# Patient Record
Sex: MALE | Race: WHITE | Employment: OTHER | URBAN - METROPOLITAN AREA
[De-identification: names, ages, dates, MRNs, and addresses within clinical notes are randomized per-mention and may not be internally consistent; named-entity substitution may affect disease eponyms.]

---

## 2017-08-12 ENCOUNTER — HOSPITAL ENCOUNTER (OUTPATIENT)
Dept: NON INVASIVE DIAGNOSTICS | Age: 57
Discharge: HOME | End: 2017-08-12
Attending: FAMILY MEDICINE | Admitting: FAMILY MEDICINE

## 2017-08-12 DIAGNOSIS — M54.50 CHRONIC LOW BACK PAIN WITHOUT SCIATICA, UNSPECIFIED BACK PAIN LATERALITY: ICD-10-CM

## 2017-08-12 DIAGNOSIS — G89.29 CHRONIC LOW BACK PAIN WITHOUT SCIATICA, UNSPECIFIED BACK PAIN LATERALITY: ICD-10-CM

## 2019-02-19 ENCOUNTER — HOSPITAL ENCOUNTER (OUTPATIENT)
Age: 59
Discharge: HOME | End: 2019-02-19
Payer: COMMERCIAL

## 2019-02-19 LAB
EKG ATRIAL RATE: 81 BPM
EKG DIAGNOSIS: NORMAL
EKG P AXIS: 35 DEGREES
EKG P-R INTERVAL: 158 MS
EKG Q-T INTERVAL: 340 MS
EKG QRS DURATION: 90 MS
EKG QTC CALCULATION (BAZETT): 394 MS
EKG R AXIS: 81 DEGREES
EKG T AXIS: 56 DEGREES
EKG VENTRICULAR RATE: 81 BPM

## 2019-02-19 PROCEDURE — 93010 ELECTROCARDIOGRAM REPORT: CPT | Performed by: INTERNAL MEDICINE

## 2019-02-19 PROCEDURE — 93005 ELECTROCARDIOGRAM TRACING: CPT | Performed by: NURSE PRACTITIONER

## 2019-07-23 ENCOUNTER — HOSPITAL ENCOUNTER (OUTPATIENT)
Age: 59
Discharge: HOME | End: 2019-07-23
Payer: COMMERCIAL

## 2019-07-23 ENCOUNTER — HOSPITAL ENCOUNTER (OUTPATIENT)
Dept: GENERAL RADIOLOGY | Age: 59
Discharge: HOME | End: 2019-07-23
Payer: COMMERCIAL

## 2019-07-23 DIAGNOSIS — M46.96 INFLAMMATION OF LUMBAR SPINE (HCC): ICD-10-CM

## 2019-07-23 PROCEDURE — 72100 X-RAY EXAM L-S SPINE 2/3 VWS: CPT

## 2020-07-11 ENCOUNTER — APPOINTMENT (OUTPATIENT)
Dept: GENERAL RADIOLOGY | Age: 60
DRG: 392 | End: 2020-07-11
Payer: MEDICARE

## 2020-07-11 ENCOUNTER — APPOINTMENT (OUTPATIENT)
Dept: CT IMAGING | Age: 60
DRG: 392 | End: 2020-07-11
Payer: MEDICARE

## 2020-07-11 ENCOUNTER — HOSPITAL ENCOUNTER (INPATIENT)
Age: 60
LOS: 1 days | Discharge: HOME | DRG: 392 | End: 2020-07-12
Attending: EMERGENCY MEDICINE | Admitting: INTERNAL MEDICINE
Payer: MEDICARE

## 2020-07-11 PROBLEM — K57.92 DIVERTICULITIS: Status: ACTIVE | Noted: 2020-07-11

## 2020-07-11 LAB
A/G RATIO: 1.2 (ref 1.1–2.2)
ALBUMIN SERPL-MCNC: 4.3 G/DL (ref 3.4–5)
ALP BLD-CCNC: 86 U/L (ref 40–129)
ALT SERPL-CCNC: 16 U/L (ref 10–40)
ANION GAP SERPL CALCULATED.3IONS-SCNC: 14 MMOL/L (ref 3–16)
AST SERPL-CCNC: 13 U/L (ref 15–37)
BASOPHILS ABSOLUTE: 0 K/UL (ref 0–0.2)
BASOPHILS RELATIVE PERCENT: 0.4 %
BILIRUB SERPL-MCNC: 0.5 MG/DL (ref 0–1)
BILIRUBIN URINE: NEGATIVE
BLOOD, URINE: NEGATIVE
BUN BLDV-MCNC: 16 MG/DL (ref 7–20)
CALCIUM SERPL-MCNC: 9.2 MG/DL (ref 8.3–10.6)
CHLORIDE BLD-SCNC: 101 MMOL/L (ref 99–110)
CLARITY: CLEAR
CO2: 25 MMOL/L (ref 21–32)
COLOR: YELLOW
CREAT SERPL-MCNC: 0.6 MG/DL (ref 0.8–1.3)
EOSINOPHILS ABSOLUTE: 0.1 K/UL (ref 0–0.6)
EOSINOPHILS RELATIVE PERCENT: 0.8 %
GFR AFRICAN AMERICAN: >60
GFR NON-AFRICAN AMERICAN: >60
GLOBULIN: 3.7 G/DL
GLUCOSE BLD-MCNC: 103 MG/DL (ref 70–99)
GLUCOSE BLD-MCNC: 148 MG/DL (ref 70–99)
GLUCOSE BLD-MCNC: 89 MG/DL (ref 70–99)
GLUCOSE URINE: >=1000 MG/DL
HCT VFR BLD CALC: 55.1 % (ref 40.5–52.5)
HEMOGLOBIN: 17.6 G/DL (ref 13.5–17.5)
KETONES, URINE: 15 MG/DL
LEUKOCYTE ESTERASE, URINE: NEGATIVE
LIPASE: 22 U/L (ref 13–60)
LYMPHOCYTES ABSOLUTE: 2.7 K/UL (ref 1–5.1)
LYMPHOCYTES RELATIVE PERCENT: 21.8 %
MCH RBC QN AUTO: 27.9 PG (ref 26–34)
MCHC RBC AUTO-ENTMCNC: 32 G/DL (ref 31–36)
MCV RBC AUTO: 87.2 FL (ref 80–100)
MICROSCOPIC EXAMINATION: ABNORMAL
MONOCYTES ABSOLUTE: 0.7 K/UL (ref 0–1.3)
MONOCYTES RELATIVE PERCENT: 5.3 %
NEUTROPHILS ABSOLUTE: 8.8 K/UL (ref 1.7–7.7)
NEUTROPHILS RELATIVE PERCENT: 71.7 %
NITRITE, URINE: NEGATIVE
PDW BLD-RTO: 12.7 % (ref 12.4–15.4)
PERFORMED ON: ABNORMAL
PERFORMED ON: NORMAL
PH UA: 5.5 (ref 5–8)
PLATELET # BLD: 237 K/UL (ref 135–450)
PMV BLD AUTO: 8.6 FL (ref 5–10.5)
POTASSIUM REFLEX MAGNESIUM: 4.6 MMOL/L (ref 3.5–5.1)
PROTEIN UA: NEGATIVE MG/DL
RBC # BLD: 6.32 M/UL (ref 4.2–5.9)
SODIUM BLD-SCNC: 140 MMOL/L (ref 136–145)
SPECIFIC GRAVITY UA: 1.02 (ref 1–1.03)
TOTAL PROTEIN: 8 G/DL (ref 6.4–8.2)
TROPONIN: <0.01 NG/ML
URINE REFLEX TO CULTURE: ABNORMAL
URINE TYPE: ABNORMAL
UROBILINOGEN, URINE: 0.2 E.U./DL
WBC # BLD: 12.3 K/UL (ref 4–11)

## 2020-07-11 PROCEDURE — 2500000003 HC RX 250 WO HCPCS: Performed by: EMERGENCY MEDICINE

## 2020-07-11 PROCEDURE — 87040 BLOOD CULTURE FOR BACTERIA: CPT

## 2020-07-11 PROCEDURE — 2500000003 HC RX 250 WO HCPCS: Performed by: INTERNAL MEDICINE

## 2020-07-11 PROCEDURE — 83690 ASSAY OF LIPASE: CPT

## 2020-07-11 PROCEDURE — 96365 THER/PROPH/DIAG IV INF INIT: CPT

## 2020-07-11 PROCEDURE — 36415 COLL VENOUS BLD VENIPUNCTURE: CPT

## 2020-07-11 PROCEDURE — 2580000003 HC RX 258: Performed by: INTERNAL MEDICINE

## 2020-07-11 PROCEDURE — 6360000004 HC RX CONTRAST MEDICATION: Performed by: EMERGENCY MEDICINE

## 2020-07-11 PROCEDURE — 6370000000 HC RX 637 (ALT 250 FOR IP): Performed by: EMERGENCY MEDICINE

## 2020-07-11 PROCEDURE — 6360000002 HC RX W HCPCS: Performed by: INTERNAL MEDICINE

## 2020-07-11 PROCEDURE — 6360000002 HC RX W HCPCS: Performed by: EMERGENCY MEDICINE

## 2020-07-11 PROCEDURE — 85025 COMPLETE CBC W/AUTO DIFF WBC: CPT

## 2020-07-11 PROCEDURE — 99285 EMERGENCY DEPT VISIT HI MDM: CPT

## 2020-07-11 PROCEDURE — 1200000000 HC SEMI PRIVATE

## 2020-07-11 PROCEDURE — 96375 TX/PRO/DX INJ NEW DRUG ADDON: CPT

## 2020-07-11 PROCEDURE — 6370000000 HC RX 637 (ALT 250 FOR IP): Performed by: INTERNAL MEDICINE

## 2020-07-11 PROCEDURE — 74177 CT ABD & PELVIS W/CONTRAST: CPT

## 2020-07-11 PROCEDURE — 84484 ASSAY OF TROPONIN QUANT: CPT

## 2020-07-11 PROCEDURE — 94640 AIRWAY INHALATION TREATMENT: CPT

## 2020-07-11 PROCEDURE — 80053 COMPREHEN METABOLIC PANEL: CPT

## 2020-07-11 PROCEDURE — 93005 ELECTROCARDIOGRAM TRACING: CPT | Performed by: EMERGENCY MEDICINE

## 2020-07-11 PROCEDURE — 71046 X-RAY EXAM CHEST 2 VIEWS: CPT

## 2020-07-11 PROCEDURE — 81003 URINALYSIS AUTO W/O SCOPE: CPT

## 2020-07-11 RX ORDER — ACETAMINOPHEN 325 MG/1
650 TABLET ORAL EVERY 6 HOURS PRN
Status: DISCONTINUED | OUTPATIENT
Start: 2020-07-11 | End: 2020-07-12 | Stop reason: HOSPADM

## 2020-07-11 RX ORDER — ATORVASTATIN CALCIUM 40 MG/1
40 TABLET, FILM COATED ORAL NIGHTLY
Status: DISCONTINUED | OUTPATIENT
Start: 2020-07-11 | End: 2020-07-12 | Stop reason: HOSPADM

## 2020-07-11 RX ORDER — NICOTINE POLACRILEX 4 MG
15 LOZENGE BUCCAL PRN
Status: DISCONTINUED | OUTPATIENT
Start: 2020-07-11 | End: 2020-07-12 | Stop reason: HOSPADM

## 2020-07-11 RX ORDER — BUDESONIDE AND FORMOTEROL FUMARATE DIHYDRATE 160; 4.5 UG/1; UG/1
2 AEROSOL RESPIRATORY (INHALATION) 2 TIMES DAILY
Status: DISCONTINUED | OUTPATIENT
Start: 2020-07-11 | End: 2020-07-12 | Stop reason: HOSPADM

## 2020-07-11 RX ORDER — NICOTINE 21 MG/24HR
1 PATCH, TRANSDERMAL 24 HOURS TRANSDERMAL DAILY
Status: DISCONTINUED | OUTPATIENT
Start: 2020-07-11 | End: 2020-07-12 | Stop reason: HOSPADM

## 2020-07-11 RX ORDER — ALBUTEROL SULFATE 2.5 MG/3ML
2.5 SOLUTION RESPIRATORY (INHALATION) EVERY 6 HOURS PRN
Status: DISCONTINUED | OUTPATIENT
Start: 2020-07-11 | End: 2020-07-12 | Stop reason: HOSPADM

## 2020-07-11 RX ORDER — CIPROFLOXACIN 2 MG/ML
400 INJECTION, SOLUTION INTRAVENOUS EVERY 12 HOURS
Status: DISCONTINUED | OUTPATIENT
Start: 2020-07-11 | End: 2020-07-12 | Stop reason: HOSPADM

## 2020-07-11 RX ORDER — ACETAMINOPHEN 650 MG/1
650 SUPPOSITORY RECTAL EVERY 6 HOURS PRN
Status: DISCONTINUED | OUTPATIENT
Start: 2020-07-11 | End: 2020-07-12 | Stop reason: HOSPADM

## 2020-07-11 RX ORDER — DEXTROSE MONOHYDRATE 50 MG/ML
100 INJECTION, SOLUTION INTRAVENOUS PRN
Status: DISCONTINUED | OUTPATIENT
Start: 2020-07-11 | End: 2020-07-12 | Stop reason: HOSPADM

## 2020-07-11 RX ORDER — SODIUM CHLORIDE 0.9 % (FLUSH) 0.9 %
10 SYRINGE (ML) INJECTION PRN
Status: DISCONTINUED | OUTPATIENT
Start: 2020-07-11 | End: 2020-07-12 | Stop reason: HOSPADM

## 2020-07-11 RX ORDER — PROMETHAZINE HYDROCHLORIDE 25 MG/1
12.5 TABLET ORAL EVERY 6 HOURS PRN
Status: DISCONTINUED | OUTPATIENT
Start: 2020-07-11 | End: 2020-07-12 | Stop reason: HOSPADM

## 2020-07-11 RX ORDER — OXYCODONE HYDROCHLORIDE AND ACETAMINOPHEN 5; 325 MG/1; MG/1
1 TABLET ORAL EVERY 4 HOURS PRN
Status: DISCONTINUED | OUTPATIENT
Start: 2020-07-11 | End: 2020-07-12 | Stop reason: HOSPADM

## 2020-07-11 RX ORDER — SODIUM CHLORIDE 0.9 % (FLUSH) 0.9 %
10 SYRINGE (ML) INJECTION EVERY 12 HOURS SCHEDULED
Status: DISCONTINUED | OUTPATIENT
Start: 2020-07-11 | End: 2020-07-12 | Stop reason: HOSPADM

## 2020-07-11 RX ORDER — CIPROFLOXACIN 2 MG/ML
400 INJECTION, SOLUTION INTRAVENOUS ONCE
Status: COMPLETED | OUTPATIENT
Start: 2020-07-11 | End: 2020-07-11

## 2020-07-11 RX ORDER — DEXTROSE MONOHYDRATE 25 G/50ML
12.5 INJECTION, SOLUTION INTRAVENOUS PRN
Status: DISCONTINUED | OUTPATIENT
Start: 2020-07-11 | End: 2020-07-12 | Stop reason: HOSPADM

## 2020-07-11 RX ORDER — POLYETHYLENE GLYCOL 3350 17 G/17G
17 POWDER, FOR SOLUTION ORAL DAILY PRN
Status: DISCONTINUED | OUTPATIENT
Start: 2020-07-11 | End: 2020-07-12 | Stop reason: HOSPADM

## 2020-07-11 RX ORDER — ONDANSETRON 2 MG/ML
4 INJECTION INTRAMUSCULAR; INTRAVENOUS EVERY 6 HOURS PRN
Status: DISCONTINUED | OUTPATIENT
Start: 2020-07-11 | End: 2020-07-12 | Stop reason: HOSPADM

## 2020-07-11 RX ORDER — ASPIRIN 81 MG/1
81 TABLET ORAL DAILY
Status: DISCONTINUED | OUTPATIENT
Start: 2020-07-12 | End: 2020-07-12 | Stop reason: HOSPADM

## 2020-07-11 RX ADMIN — METRONIDAZOLE 500 MG: 500 INJECTION, SOLUTION INTRAVENOUS at 19:49

## 2020-07-11 RX ADMIN — CIPROFLOXACIN 400 MG: 2 INJECTION, SOLUTION INTRAVENOUS at 12:20

## 2020-07-11 RX ADMIN — OXYCODONE HYDROCHLORIDE AND ACETAMINOPHEN 1 TABLET: 5; 325 TABLET ORAL at 19:49

## 2020-07-11 RX ADMIN — METOPROLOL TARTRATE 25 MG: 25 TABLET, FILM COATED ORAL at 21:16

## 2020-07-11 RX ADMIN — FAMOTIDINE 20 MG: 10 INJECTION, SOLUTION INTRAVENOUS at 10:43

## 2020-07-11 RX ADMIN — METRONIDAZOLE 500 MG: 500 INJECTION, SOLUTION INTRAVENOUS at 13:25

## 2020-07-11 RX ADMIN — IOVERSOL 100 ML: 678 INJECTION INTRA-ARTERIAL; INTRAVENOUS at 11:18

## 2020-07-11 RX ADMIN — ATORVASTATIN CALCIUM 40 MG: 40 TABLET, FILM COATED ORAL at 21:16

## 2020-07-11 RX ADMIN — CIPROFLOXACIN 400 MG: 2 INJECTION, SOLUTION INTRAVENOUS at 21:16

## 2020-07-11 RX ADMIN — BUDESONIDE AND FORMOTEROL FUMARATE DIHYDRATE 2 PUFF: 160; 4.5 AEROSOL RESPIRATORY (INHALATION) at 20:08

## 2020-07-11 RX ADMIN — Medication 10 ML: at 20:01

## 2020-07-11 RX ADMIN — LIDOCAINE HYDROCHLORIDE: 20 SOLUTION ORAL; TOPICAL at 10:43

## 2020-07-11 ASSESSMENT — PAIN DESCRIPTION - LOCATION
LOCATION: ABDOMEN
LOCATION: BACK
LOCATION: ABDOMEN

## 2020-07-11 ASSESSMENT — PAIN DESCRIPTION - DESCRIPTORS
DESCRIPTORS: ACHING
DESCRIPTORS: BURNING

## 2020-07-11 ASSESSMENT — PAIN SCALES - GENERAL
PAINLEVEL_OUTOF10: 4
PAINLEVEL_OUTOF10: 6
PAINLEVEL_OUTOF10: 5
PAINLEVEL_OUTOF10: 8
PAINLEVEL_OUTOF10: 0
PAINLEVEL_OUTOF10: 5

## 2020-07-11 ASSESSMENT — PAIN DESCRIPTION - ONSET
ONSET: ON-GOING
ONSET: ON-GOING

## 2020-07-11 ASSESSMENT — PAIN DESCRIPTION - ORIENTATION
ORIENTATION: RIGHT;LEFT;MID
ORIENTATION: LOWER

## 2020-07-11 ASSESSMENT — PAIN DESCRIPTION - PAIN TYPE
TYPE: CHRONIC PAIN
TYPE: ACUTE PAIN

## 2020-07-11 ASSESSMENT — PAIN DESCRIPTION - FREQUENCY
FREQUENCY: CONTINUOUS

## 2020-07-11 ASSESSMENT — PAIN DESCRIPTION - PROGRESSION
CLINICAL_PROGRESSION: GRADUALLY WORSENING
CLINICAL_PROGRESSION: GRADUALLY WORSENING

## 2020-07-11 ASSESSMENT — PAIN - FUNCTIONAL ASSESSMENT
PAIN_FUNCTIONAL_ASSESSMENT: ACTIVITIES ARE NOT PREVENTED
PAIN_FUNCTIONAL_ASSESSMENT: PREVENTS OR INTERFERES SOME ACTIVE ACTIVITIES AND ADLS

## 2020-07-11 NOTE — ED TRIAGE NOTES
Patient came to ER with complaints of abdominal burning pain since Wednesday. No nausea or vomiting.

## 2020-07-11 NOTE — PROGRESS NOTES
Patient in bed, eyes closed, no distress noted. Daughter Talib Martínez [phone] called and updated per patient's request. He tolerated his clear liquid diet, and went to sleep. He reports he lives in a senior building at [facility]. He has a hospital bed at home. He denies the need for pain medication at this time. Call light in reach.

## 2020-07-11 NOTE — ED NOTES
Dr Guanakito Brunson aware of elevated SIRS score. No new orders.        Karlos Pino RN  07/11/20 1124

## 2020-07-11 NOTE — ED NOTES
Patient gave permission for Dr. Amie Pena to speak to patient daughter. Patient stated he does not comprehend everything the doctor tells him.        Melissa Hayden RN  07/11/20 9897

## 2020-07-11 NOTE — ED NOTES
Reoprt given to [facility].      Jose Guadalupe Eisenberg RN  07/11/20 5033

## 2020-07-11 NOTE — ED NOTES
Sima from [facility] center called and Dr. Dave Ford on line to speak to Dr. Kelly Osorio.        Jan Godoy RN  07/11/20 3154

## 2020-07-11 NOTE — PROGRESS NOTES
4 Eyes Admission Assessment     I agree as the admission nurse that 2 RN's have performed a thorough Head to Toe Skin Assessment on the patient. ALL assessment sites listed below have been assessed on admission. Areas assessed by both nurses: josie childs/jeaneth  [x]   Head, Face, and Ears   [x]   Shoulders, Back, and Chest  [x]   Arms, Elbows, and Hands   [x]   Coccyx, Sacrum, and Ischum  [x]   Legs, Feet, and Heels        Does the Patient have Skin Breakdown?   No         Wil Prevention initiated:  NO  Wound Care Orders initiated:  No      [facility] nurse consulted for Pressure Injury (Stage 3,4, Unstageable, DTI, NWPT, and Complex wounds):  N/A      Nurse 1 eSignature: Electronically signed by Darlin Plascencia RN on 7/11/2020 at 5:25 PM      **SHARE this note so that the co-signing nurse is able to place an eSignature**    Nurse 2 eSignature: Electronically signed by Burak Tovar RN on 7/11/20 at 5:51 PM EDT

## 2020-07-11 NOTE — PROGRESS NOTES
Patient to room [id number] from [facility] by ambulance  Transport. Patient is alert and oriented X4. Vitals: stable. Blood Sugar  103 on arrival to room. Margrosate Jose Aing supplied for patient comfort. Nicotine patch applied for patient comfort. Review of floor, room, call light, phone and hospital policies complete. Patient verbalized understanding. All questions have been answered.  Will monitor

## 2020-07-11 NOTE — ED PROVIDER NOTES
Spouse name: Not on file    Number of children: Not on file    Years of education: Not on file    Highest education level: Not on file   Occupational History    Occupation: disabled (copd)   Social Needs    Financial resource strain: Not on file    Food insecurity     Worry: Not on file     Inability: Not on file    Transportation needs     Medical: Not on file     Non-medical: Not on file   Tobacco Use    Smoking status: Current Every Day Smoker     Packs/day: 1.50     Years: 30.00     Pack years: 45.00     Types: Cigarettes    Smokeless tobacco: Never Used   Substance and Sexual Activity    Alcohol use: No    Drug use: No    Sexual activity: Not Currently   Lifestyle    Physical activity     Days per week: Not on file     Minutes per session: Not on file    Stress: Not on file   Relationships    Social connections     Talks on phone: Not on file     Gets together: Not on file     Attends Spiritism service: Not on file     Active member of club or organization: Not on file     Attends meetings of clubs or organizations: Not on file     Relationship status: Not on file    Intimate partner violence     Fear of current or ex partner: Not on file     Emotionally abused: Not on file     Physically abused: Not on file     Forced sexual activity: Not on file   Other Topics Concern    Not on file   Social History Narrative    Not on file     Current Facility-Administered Medications   Medication Dose Route Frequency Provider Last Rate Last Dose    ciprofloxacin (CIPRO) IVPB 400 mg  400 mg Intravenous Once Noemi Corona  mL/hr at 07/11/20 1220 400 mg at 07/11/20 1220    metronidazole (FLAGYL) 500 mg in NaCl 100 mL IVPB premix  500 mg Intravenous Once Noemi Corona MD         Current Outpatient Medications   Medication Sig Dispense Refill    loratadine (CLARITIN) 10 MG tablet Take 1 tablet by mouth daily 15 tablet 0    Dulaglutide 0.75 MG/0.5ML SOPN Inject 0.75 mg into the skin      oxyCODONE-acetaminophen (PERCOCET) 5-325 MG per tablet Take 1 tablet by mouth. Tere Keyes metoprolol (LOPRESSOR) 25 MG tablet Take 1 tablet by mouth 2 times daily 60 tablet 3    aspirin EC 81 MG EC tablet Take 1 tablet by mouth daily 30 tablet 11    albuterol sulfate  (90 BASE) MCG/ACT inhaler Inhale 2 puffs into the lungs every 6 hours as needed for Wheezing 1 Inhaler 2    fluticasone-salmeterol (ADVAIR DISKUS) 500-50 MCG/DOSE diskus inhaler Inhale 1 puff into the lungs every 12 hours 60 each 2    metFORMIN (GLUCOPHAGE) 500 MG tablet Take 1 tablet by mouth 2 times daily (with meals) (Patient taking differently: Take 500 mg by mouth 3 times daily (before meals) ) 180 tablet 0    atorvastatin (LIPITOR) 40 MG tablet Take 1 tablet by mouth daily 30 tablet 2    Glucose Blood (BLOOD GLUCOSE TEST STRIPS) STRP Use twice daily 60 strip 11    lisinopril (PRINIVIL) 20 MG tablet Take 1 tablet by mouth daily. 30 tablet 3    Lancets MISC Use twice per day 60 each 11    meloxicam (MOBIC) 15 MG tablet Take 1 tablet by mouth daily. 30 tablet 0    nicotine (NICODERM CQ) 21 MG/24HR Place 1 patch onto the skin every 24 hours. 30 patch 0     Allergies   Allergen Reactions    Oxycontin [Oxycodone Hcl] Other (See Comments)     Side effects \"sees stars\"       REVIEW OF SYSTEMS  10 systems reviewed, pertinent positives per HPI otherwise noted to be negative. PHYSICAL EXAM  /68   Pulse 95   Temp 97.3 °F (36.3 °C) (Oral)   Resp 20   Ht 5' 6\" (1.676 m)   Wt 249 lb 1.9 oz (113 kg)   SpO2 95%   BMI 40.21 kg/m²    GENERAL APPEARANCE: Awake and alert. Cooperative. No distress. HENT: Normocephalic. Atraumatic. Mucous membranes are moist.  NECK: Supple. EYES: PERRL. EOM's grossly intact. HEART/CHEST: RRR. No murmurs. No chest wall tenderness. LUNGS: Respirations unlabored. CTAB. Good air exchange. Speaking comfortably in full sentences.    ABDOMEN: Mild distention, diffuse but mild tenderness throughout the upper and middle abdomen with minimal lower abdominal ttp. Soft. No masses. No organomegaly. No guarding or rebound. Normal bowel sounds throughout. MUSCULOSKELETAL: No extremity edema. Compartments soft. No deformity. No tenderness in the extremities. All extremities neurovascularly intact. SKIN: Warm and dry. No acute rashes. NEUROLOGICAL: Alert and oriented. CN's 2-12 intact. No gross facial drooping. Strength 5/5, sensation intact. 2 plus DTR's in knees bilaterally. Gait normal.  PSYCHIATRIC: Normal mood and affect. LABS  I have reviewed all labs for this visit.    Results for orders placed or performed during the hospital encounter of 07/11/20   CBC Auto Differential   Result Value Ref Range    WBC 12.3 (H) 4.0 - 11.0 K/uL    RBC 6.32 (H) 4.20 - 5.90 M/uL    Hemoglobin 17.6 (H) 13.5 - 17.5 g/dL    Hematocrit 55.1 (H) 40.5 - 52.5 %    MCV 87.2 80.0 - 100.0 fL    MCH 27.9 26.0 - 34.0 pg    MCHC 32.0 31.0 - 36.0 g/dL    RDW 12.7 12.4 - 15.4 %    Platelets 553 275 - 894 K/uL    MPV 8.6 5.0 - 10.5 fL    Neutrophils % 71.7 %    Lymphocytes % 21.8 %    Monocytes % 5.3 %    Eosinophils % 0.8 %    Basophils % 0.4 %    Neutrophils Absolute 8.8 (H) 1.7 - 7.7 K/uL    Lymphocytes Absolute 2.7 1.0 - 5.1 K/uL    Monocytes Absolute 0.7 0.0 - 1.3 K/uL    Eosinophils Absolute 0.1 0.0 - 0.6 K/uL    Basophils Absolute 0.0 0.0 - 0.2 K/uL   Comprehensive Metabolic Panel w/ Reflex to MG   Result Value Ref Range    Sodium 140 136 - 145 mmol/L    Potassium reflex Magnesium 4.6 3.5 - 5.1 mmol/L    Chloride 101 99 - 110 mmol/L    CO2 25 21 - 32 mmol/L    Anion Gap 14 3 - 16    Glucose 148 (H) 70 - 99 mg/dL    BUN 16 7 - 20 mg/dL    CREATININE 0.6 (L) 0.8 - 1.3 mg/dL    GFR Non-African American >60 >60    GFR African American >60 >60    Calcium 9.2 8.3 - 10.6 mg/dL    Total Protein 8.0 6.4 - 8.2 g/dL    Alb 4.3 3.4 - 5.0 g/dL    Albumin/Globulin Ratio 1.2 1.1 - 2.2    Total Bilirubin 0.5 0.0 - 1.0 mg/dL    Alkaline Phosphatase 86 40 - 129 U/L    ALT 16 10 - 40 U/L    AST 13 (L) 15 - 37 U/L    Globulin 3.7 g/dL   Troponin   Result Value Ref Range    Troponin <0.01 <0.01 ng/mL   Lipase   Result Value Ref Range    Lipase 22.0 13.0 - 60.0 U/L   Urine reflex to culture    Specimen: Urine, clean catch   Result Value Ref Range    Color, UA Yellow Straw/Yellow    Clarity, UA Clear Clear    Glucose, Ur >=1000 (A) Negative mg/dL    Bilirubin Urine Negative Negative    Ketones, Urine 15 (A) Negative mg/dL    Specific Gravity, UA 1.025 1.005 - 1.030    Blood, Urine Negative Negative    pH, UA 5.5 5.0 - 8.0    Protein, UA Negative Negative mg/dL    Urobilinogen, Urine 0.2 <2.0 E.U./dL    Nitrite, Urine Negative Negative    Leukocyte Esterase, Urine Negative Negative    Microscopic Examination Not Indicated     Urine Type Voided     Urine Reflex to Culture Not Indicated      The 12 lead EKG was interpreted by me as follows:  Rate: normal with a rate of 88  Rhythm: sinus  Axis: normal  Intervals: normal MN, narrow QRS, normal QTc  ST segments: no ST elevations or depressions  T waves: no abnormal inversions  Non-specific T wave changes: not present  Prior EKG comparison: EKG dated 2/19/19 is not significantly different      RADIOLOGY    Xr Chest Standard (2 Vw)    Result Date: 7/11/2020  EXAMINATION: TWO XRAY VIEWS OF THE CHEST 7/11/2020 11:08 am COMPARISON: 02/18/2016 HISTORY: ORDERING SYSTEM PROVIDED HISTORY: upper abd pain TECHNOLOGIST PROVIDED HISTORY: Reason for exam:->upper abd pain Reason for Exam: UPPER ABDOMEN PAIN SINCE WEDS DENIES COUGH   HX COPD DIABETES Acuity: Acute Type of Exam: Initial FINDINGS: Stable appearing hyperinflation suggesting underlying COPD, please correlate with smoking history. The lungs are without acute focal process. No effusion or pneumothorax. The cardiomediastinal silhouette is stable. The osseous structures are intact without acute process. Stable appearing chest without acute process.      Ct There are shotty right iliac lymph node, likely reactive. Bones/Soft Tissues: No aggressive osseous abnormality. Evidence of prior ventral abdominal wall hernia repair. 1. Sigmoid diverticulitis. Adjacent gas and fluid containing collection is most consistent in appearance with a prominent diverticulum, but early abscess cannot be excluded. Attention at follow-up is recommended. 2. Hepatic steatosis. ED COURSE/MDM  Patient seen and evaluated. Old records reviewed. Labs and imaging reviewed and results discussed with patient. After initial evaluation, differential diagnostic considerations included: kidney stone, pyelonephritis, UTI, appendicitis, bowel obstruction, diverticulitis, hernia, gastritis/gastroenteritis, pancreatitis, cholecystitis, hepatitis, constipation, IBS, IBD    The patient's ED workup was notable for diverticulitis. CAT scan also questions whether or not there is an early abscess and some adjacent gas also might suggest microperforation. He has no peritonitis on exam but given his leukocytosis and other findings I do feel that admission to the hospital with IV antibiotics is warranted. He was given Cipro and Flagyl and kept n.p.o. He has no findings to suggest an atypical cardiac presentation, with negative troponin and normal EKG. He was initially quite hesitant to be admitted to the hospital, stating that he would [facility] go to [facility] but also refused to let me facilitate a transfer there.   Later his daughter called the ER and spoke with him and he was finally amenable to being admitted to [facility].    During the patient's ED course, the patient was given:  Medications   ciprofloxacin (CIPRO) IVPB 400 mg (400 mg Intravenous New Bag 7/11/20 1220)   metronidazole (FLAGYL) 500 mg in NaCl 100 mL IVPB premix (has no administration in time range)   aluminum & magnesium hydroxide-simethicone (MAALOX) 30 mL, lidocaine viscous hcl (XYLOCAINE) 5 mL (GI COCKTAIL) ( Oral Given 7/11/20 1043)   famotidine (PEPCID) injection 20 mg (20 mg Intravenous Given 7/11/20 1043)   ioversol (OPTIRAY) 68 % injection 100 mL (100 mLs Intravenous Given 7/11/20 1118)        CLINICAL IMPRESSION  1. Diverticulitis of colon        Blood pressure 108/68, pulse 95, temperature 97.3 °F (36.3 °C), temperature source Oral, resp. rate 20, height 5' 6\" (1.676 m), weight 249 lb 1.9 oz (113 kg), SpO2 95 %. [address] [place] [address] was admitted in fair condition. The plan is to admit to the hospital at this time under the hospitalist service. Dr. Ede Humphreys accepted the patient and will take over the patient's care. DISCLAIMER: This chart was created using Dragon dictation software. Efforts were made by me to ensure accuracy, however some errors may be present due to limitations of this technology and occasionally words are not transcribed correctly.         Yanet Guajardo MD  07/11/20 6079

## 2020-07-11 NOTE — H&P
Hospital Medicine History & Physical      PCP: KENISHA Perry CNP    Date of Admission: 7/11/2020    Date of Service: Pt seen/examined on  07/11/20 and Admitted to Inpatient     Chief Complaint:    Chief Complaint   Patient presents with    Abdominal Pain     Started on Wednesday. Had BM yesterday. Lower abdomen. Burning in abdomen. History Of Present Illness: The patient is a 56 y.o. male with PMH significant for arthritis, COPD, diabetes, hypertension, hyperlipidemia, obesity who presents to [facility] with abdominal pain. Patient reports bilateral lower abdominal pain that started on Wednesday. He describes it as a burning pain that was as bad as a 7 out of 10. Pain came and went and was not related to bowel movements or eating. He decided to come into the hospital today since it was not getting better. Denies chest pain, shortness of breath, fever, nausea, vomiting, diarrhea, constipation, dysuria. Labs in the ED showed with mild leukocytosis of 12.3, hemoglobin 17.6. CT abdomen pelvis with diverticulitis, read as possible developing abscess versus diverticulum. Past Medical History:        Diagnosis Date    Arthritis     Chronic low back pain     COPD (chronic obstructive pulmonary disease) (HCC)     Diabetes mellitus ([facility] [address]     History of colon polyps 2012    Hyperlipidemia     Hypertension     Obesity     Tobacco abuse        Past Surgical History:        Procedure Laterality Date    UMBILICAL HERNIA REPAIR  2011       Medications Prior to Admission:    Prior to Admission medications    Medication Sig Start Date End Date Taking?  Authorizing Provider   loratadine (CLARITIN) 10 MG tablet Take 1 tablet by mouth daily 5/13/18  Yes Benigno Macario MD   Dulaglutide 0.75 MG/0.5ML SOPN Inject 0.75 mg into the skin   Yes Historical Provider, MD oxyCODONE-acetaminophen (PERCOCET) 5-325 MG per tablet Take 1 tablet by mouth. .   Yes Sonia Napier MD   metoprolol (LOPRESSOR) 25 MG tablet Take 1 tablet by mouth 2 times daily 1/9/16  Yes Lexie Vieyra MD   aspirin EC 81 MG EC tablet Take 1 tablet by mouth daily 1/9/16  Yes Lexie Vieyra MD   albuterol sulfate  (90 BASE) MCG/ACT inhaler Inhale 2 puffs into the lungs every 6 hours as needed for Wheezing 1/9/16  Yes Lexie Vieyra MD   fluticasone-salmeterol (ADVAIR DISKUS) 500-50 MCG/DOSE diskus inhaler Inhale 1 puff into the lungs every 12 hours 1/9/16  Yes Lexie Vieyra MD   metFORMIN (GLUCOPHAGE) 500 MG tablet Take 1 tablet by mouth 2 times daily (with meals)  Patient taking differently: Take 500 mg by mouth 3 times daily (before meals)  1/9/16  Yes Lexie Vieyra MD   atorvastatin (LIPITOR) 40 MG tablet Take 1 tablet by mouth daily 1/9/16  Yes Lexie Vieyra MD   Glucose Blood (BLOOD GLUCOSE TEST STRIPS) STRP Use twice daily 2/27/15  Yes Moise Stark MD   lisinopril (PRINIVIL) 20 MG tablet Take 1 tablet by mouth daily. 2/27/15  Yes Moise Stark MD   Lancets MISC Use twice per day 2/27/15  Yes Moise Stark MD   meloxicam (MOBIC) 15 MG tablet Take 1 tablet by mouth daily. 2/4/15  Yes Moise Stark MD   nicotine (NICODERM CQ) 21 MG/24HR Place 1 patch onto the skin every 24 hours. 2/4/15 2/4/16  Moise Stark MD       Allergies:  Oxycontin [oxycodone hcl]    Social History:  The patient currently lives alone    TOBACCO:   reports that he has been smoking cigarettes. He has a 45.00 pack-year smoking history. He has never used smokeless tobacco.  ETOH:   reports no history of alcohol use. Family History:  Reviewed in detail and negative for DM, Early CAD, Cancer, CVA. Positive as follows:        Problem Relation Age of Onset    Diabetes Mother     Stroke Mother     Hypertension Mother     Other Mother         DVT       REVIEW OF SYSTEMS:   Positive as noted in the HPI. All other systems reviewed and negative. PHYSICAL EXAM:    /86   Pulse 90   Temp 98.1 °F (36.7 °C) (Oral)   Resp 14   Ht 5' 6\" (1.676 m)   Wt 249 lb 1.9 oz (113 kg)   SpO2 93%   BMI 40.21 kg/m²     General appearance: No apparent distress appears stated age and cooperative. Obese  HEENT Normal cephalic, atraumatic without obvious deformity. Pupils equal, round, and reactive to light. Extra ocular muscles intact. Conjunctivae/corneas clear. Neck: Supple, trachea midline without thyromegaly or adenopathy with full range of motion. Lungs: Clear to auscultation, bilaterally without Rales/Wheezes/Rhonchi with good respiratory effort. Heart: Regular rate and rhythm with Normal S1/S2 without murmurs, rubs or gallops, point of maximum impulse non-displaced  Abdomen: Soft, mildly tender to palpation bilateral lower quadrants or non-distended without rigidity or guarding and positive bowel sounds all four quadrants. Extremities: No clubbing, cyanosis, or edema bilaterally. Full range of motion without deformity and normal gait intact. Skin: Skin color, texture, turgor normal.  No rashes or lesions. Neurologic: Alert and oriented X 3, neurovascularly intact with sensory/motor intact upper extremities/lower extremities, bilaterally. Cranial nerves: II-XII intact, grossly non-focal.  Mental status: Alert, oriented, thought content appropriate. Capillary Refill: Acceptable  < 3 seconds  Peripheral Pulses: +3 Easily felt, not easily obliterated with pressure      CXR:  I have reviewed the CXR with the following interpretation: no acute process    CBC   Recent Labs     07/11/20  1040   WBC 12.3*   HGB 17.6*   HCT 55.1*         RENAL  Recent Labs     07/11/20  1040      K 4.6      CO2 25   BUN 16   CREATININE 0.6*     LFT'S  Recent Labs     07/11/20  1040   AST 13*   ALT 16   BILITOT 0.5   ALKPHOS 86     COAG  No results for input(s): INR in the last 72 hours.   CARDIAC ENZYMES  Recent Labs     07/11/20  1040   TROPONINI <0.01       U/A:    Lab Results   Component Value Date    COLORU Yellow 07/11/2020    CLARITYU Clear 07/11/2020    SPECGRAV 1.025 07/11/2020    LEUKOCYTESUR Negative 07/11/2020    BLOODU Negative 07/11/2020    GLUCOSEU >=1000 07/11/2020       ABG  No results found for: JRV8WMY, BEART, G8KOQZAJ, PHART, THGBART, LUR2SVE, PO2ART, CPW9YVD        Active Hospital Problems    Diagnosis Date Noted    Diverticulitis [K57.92] 07/11/2020         PHYSICIANS CERTIFICATION:    I certify that Karie Moura is expected to be hospitalized for more than 2 midnights based on the following assessment and plan:      ASSESSMENT/PLAN:    Diverticulitis  CT scan with diverticulitis, possible developing abscess or microperforation. No peritoneal signs  -Cipro Flagyl  -General surgery consulted  -Clear liquid diet    COPD  Stable, not in acute exacerbation. - continue home meds    Diabetes Mellitus Type II    Lab Results   Component Value Date    LABA1C 7.9 01/08/2016     Lab Results   Component Value Date    .0 01/08/2016      Well controlled. - hold home oral hypoglycemic agents  - basal bolus insulin  - FSBG qac and qhs; hypoglycemic protocol    Hypertension  Stable. - continue home meds    Hyperlipidemia  Continue statin. Morbid  Obesity  Body mass index is 40.21 kg/m². Counseled on effects of weight on overall health and chronic medical conditions and discussed weight loss. Tobacco abuse  Smokes 1 pack a day, down from 2.  -Nicotine replacement therapy    DVT Prophylaxis: Lovenox  Diet: DIET CLEAR LIQUID;  Code Status: Full Code  PT/OT Eval Status: ordered    [employer] - pending       Cristina Paez MD    Thank you KENISHA Sadler - TONIO for the opportunity to be involved in this patient's care. If you have any questions or concerns please feel free to contact me at [phone]. This note was transcribed using [address].  Please disregard any translational errors.

## 2020-07-12 VITALS
RESPIRATION RATE: 14 BRPM | DIASTOLIC BLOOD PRESSURE: 86 MMHG | OXYGEN SATURATION: 97 % | HEART RATE: 63 BPM | WEIGHT: 249.34 LBS | BODY MASS INDEX: 40.07 KG/M2 | SYSTOLIC BLOOD PRESSURE: 131 MMHG | TEMPERATURE: 97.4 F | HEIGHT: 66 IN

## 2020-07-12 LAB
ALBUMIN SERPL-MCNC: 3.5 G/DL (ref 3.4–5)
ANION GAP SERPL CALCULATED.3IONS-SCNC: 14 MMOL/L (ref 3–16)
BUN BLDV-MCNC: 12 MG/DL (ref 7–20)
CALCIUM SERPL-MCNC: 8.9 MG/DL (ref 8.3–10.6)
CHLORIDE BLD-SCNC: 98 MMOL/L (ref 99–110)
CO2: 26 MMOL/L (ref 21–32)
CREAT SERPL-MCNC: 0.6 MG/DL (ref 0.8–1.3)
EKG ATRIAL RATE: 88 BPM
EKG DIAGNOSIS: NORMAL
EKG P AXIS: 46 DEGREES
EKG P-R INTERVAL: 148 MS
EKG Q-T INTERVAL: 344 MS
EKG QRS DURATION: 92 MS
EKG QTC CALCULATION (BAZETT): 416 MS
EKG R AXIS: 76 DEGREES
EKG T AXIS: 46 DEGREES
EKG VENTRICULAR RATE: 88 BPM
GFR AFRICAN AMERICAN: >60
GFR NON-AFRICAN AMERICAN: >60
GLUCOSE BLD-MCNC: 103 MG/DL (ref 70–99)
GLUCOSE BLD-MCNC: 108 MG/DL (ref 70–99)
GLUCOSE BLD-MCNC: 141 MG/DL (ref 70–99)
GLUCOSE BLD-MCNC: 156 MG/DL (ref 70–99)
HCT VFR BLD CALC: 52.4 % (ref 40.5–52.5)
HEMOGLOBIN: 17.2 G/DL (ref 13.5–17.5)
MAGNESIUM: 2.2 MG/DL (ref 1.8–2.4)
MCH RBC QN AUTO: 28.7 PG (ref 26–34)
MCHC RBC AUTO-ENTMCNC: 32.8 G/DL (ref 31–36)
MCV RBC AUTO: 87.6 FL (ref 80–100)
PDW BLD-RTO: 13.8 % (ref 12.4–15.4)
PERFORMED ON: ABNORMAL
PHOSPHORUS: 3.1 MG/DL (ref 2.5–4.9)
PLATELET # BLD: 226 K/UL (ref 135–450)
PMV BLD AUTO: 8.7 FL (ref 5–10.5)
POTASSIUM SERPL-SCNC: 4.2 MMOL/L (ref 3.5–5.1)
RBC # BLD: 5.98 M/UL (ref 4.2–5.9)
SODIUM BLD-SCNC: 138 MMOL/L (ref 136–145)
WBC # BLD: 11.9 K/UL (ref 4–11)

## 2020-07-12 PROCEDURE — 80069 RENAL FUNCTION PANEL: CPT

## 2020-07-12 PROCEDURE — 93010 ELECTROCARDIOGRAM REPORT: CPT | Performed by: INTERNAL MEDICINE

## 2020-07-12 PROCEDURE — 2580000003 HC RX 258: Performed by: INTERNAL MEDICINE

## 2020-07-12 PROCEDURE — 97161 PT EVAL LOW COMPLEX 20 MIN: CPT

## 2020-07-12 PROCEDURE — 6360000002 HC RX W HCPCS: Performed by: INTERNAL MEDICINE

## 2020-07-12 PROCEDURE — 94640 AIRWAY INHALATION TREATMENT: CPT

## 2020-07-12 PROCEDURE — 97116 GAIT TRAINING THERAPY: CPT

## 2020-07-12 PROCEDURE — 83735 ASSAY OF MAGNESIUM: CPT

## 2020-07-12 PROCEDURE — 2500000003 HC RX 250 WO HCPCS: Performed by: INTERNAL MEDICINE

## 2020-07-12 PROCEDURE — 6370000000 HC RX 637 (ALT 250 FOR IP): Performed by: INTERNAL MEDICINE

## 2020-07-12 PROCEDURE — 85027 COMPLETE CBC AUTOMATED: CPT

## 2020-07-12 PROCEDURE — 94760 N-INVAS EAR/PLS OXIMETRY 1: CPT

## 2020-07-12 PROCEDURE — 36415 COLL VENOUS BLD VENIPUNCTURE: CPT

## 2020-07-12 RX ORDER — METRONIDAZOLE 500 MG/1
500 TABLET ORAL 2 TIMES DAILY
Qty: 14 TABLET | Refills: 0 | Status: SHIPPED | OUTPATIENT
Start: 2020-07-12 | End: 2020-07-12 | Stop reason: SDUPTHER

## 2020-07-12 RX ORDER — CIPROFLOXACIN 500 MG/1
500 TABLET, FILM COATED ORAL 2 TIMES DAILY
Qty: 14 TABLET | Refills: 0 | Status: SHIPPED | OUTPATIENT
Start: 2020-07-12 | End: 2020-07-19

## 2020-07-12 RX ORDER — METRONIDAZOLE 500 MG/1
500 TABLET ORAL 2 TIMES DAILY
Qty: 14 TABLET | Refills: 0 | Status: SHIPPED | OUTPATIENT
Start: 2020-07-12 | End: 2020-07-19

## 2020-07-12 RX ORDER — CIPROFLOXACIN 500 MG/1
500 TABLET, FILM COATED ORAL 2 TIMES DAILY
Qty: 14 TABLET | Refills: 0 | Status: SHIPPED | OUTPATIENT
Start: 2020-07-12 | End: 2020-07-12 | Stop reason: SDUPTHER

## 2020-07-12 RX ADMIN — BUDESONIDE AND FORMOTEROL FUMARATE DIHYDRATE 2 PUFF: 160; 4.5 AEROSOL RESPIRATORY (INHALATION) at 07:39

## 2020-07-12 RX ADMIN — ASPIRIN 81 MG: 81 TABLET, COATED ORAL at 08:35

## 2020-07-12 RX ADMIN — INSULIN LISPRO 1 UNITS: 100 INJECTION, SOLUTION INTRAVENOUS; SUBCUTANEOUS at 08:35

## 2020-07-12 RX ADMIN — Medication 10 ML: at 08:41

## 2020-07-12 RX ADMIN — METRONIDAZOLE 500 MG: 500 INJECTION, SOLUTION INTRAVENOUS at 04:02

## 2020-07-12 RX ADMIN — METOPROLOL TARTRATE 25 MG: 25 TABLET, FILM COATED ORAL at 08:36

## 2020-07-12 RX ADMIN — INSULIN LISPRO 1 UNITS: 100 INJECTION, SOLUTION INTRAVENOUS; SUBCUTANEOUS at 12:02

## 2020-07-12 RX ADMIN — CIPROFLOXACIN 400 MG: 2 INJECTION, SOLUTION INTRAVENOUS at 09:37

## 2020-07-12 RX ADMIN — METRONIDAZOLE 500 MG: 500 INJECTION, SOLUTION INTRAVENOUS at 12:02

## 2020-07-12 RX ADMIN — ENOXAPARIN SODIUM 40 MG: 40 INJECTION SUBCUTANEOUS at 08:36

## 2020-07-12 RX ADMIN — Medication 10 ML: at 09:37

## 2020-07-12 ASSESSMENT — PAIN SCALES - GENERAL
PAINLEVEL_OUTOF10: 0
PAINLEVEL_OUTOF10: 0

## 2020-07-12 NOTE — PLAN OF CARE
Problem: Pain:  Goal: Pain level will decrease  Description: Pain level will decrease  Outcome: Ongoing  Goal: Control of acute pain  Description: Control of acute pain  Outcome: Ongoing  Goal: Control of chronic pain  Description: Control of chronic pain  Outcome: Ongoing   Pain/discomfort being managed with PRN analgesics per MD orders. Pt able to express presence and absence of pain and rate pain appropriately using numerical scale.     Problem: Falls - Risk of:  Goal: Will remain free from falls  Description: Will remain free from falls  Outcome: Ongoing  Goal: Absence of physical injury  Description: Absence of physical injury  Outcome: Ongoing

## 2020-07-12 NOTE — PROGRESS NOTES
Patient sitting on the side of the bed, no distress noted. He reports poor sleep last night. He denies nausea/vomiting and pain at this time. He continues on clear diet. He is up at [facility]. Call light in reach.

## 2020-07-12 NOTE — PROGRESS NOTES
Physical Therapy    Facility/Department: [facility] 3 ORTHOPEDICS  Initial Assessment    NAME: Jamie Nguyen  : 1960  MRN: [id number]    Date of Service: 2020    Discharge Recommendations:  Home independently   PT Equipment Recommendations  Equipment Needed: No     Jamie Nguyen scored a 24/24 on the AM-PAC short mobility form. At this time, no further PT is recommended upon discharge due to patient is independent with all functional mobility in room without a device. He has no stairs at home which he encounters and reports he has no where he goes that requires him to do stairs. Recommend patient returns to prior setting with prior services. Assessment   Assessment: Patient is a 55 yo male admitted with Diverticulitis. He has PMHx of chronic pain from workplace injury, COPD, and DM. He currently reports he is feeling much better this date. He was independent with functional mobility in room and in hallways without device. He has a hospital bed at home and was independent from his hospital bed here. He is safe for discharge to home when medically cleared and he will be discharged from PT at this time. Prognosis: Good  Decision Making: Low Complexity  History: see below  Exam: see below  Clinical Presentation: see below  PT Education: PT Role;Plan of Care  No Skilled PT: At baseline function  REQUIRES PT FOLLOW UP: No       Patient Diagnosis(es): The encounter diagnosis was Diverticulitis of colon. has a past medical history of Arthritis, Chronic low back pain, COPD (chronic obstructive pulmonary disease) ([facility] Diabetes mellitus ([facility] History of colon polyps, Hyperlipidemia, Hypertension, Obesity, and Tobacco abuse.   has a past surgical history that includes Umbilical hernia repair ().     Restrictions  Restrictions/Precautions  Restrictions/Precautions: Modified Diet  Position Activity Restriction  Other position/activity restrictions: clear liquid diet     Vision/Hearing  Vision Exceptions: Wears glasses for distance(wears when driving)  Hearing: Exceptions to [facility]  Hearing Exceptions: Hard of hearing/hearing concerns(patient reports not difficulty, but presented with difficulty hearing PT and volume on television was very high)       Subjective  General  Chart Reviewed: Yes  Additional Pertinent Hx: Per H&P, \"56 y.o. male with PMH significant for arthritis, COPD, diabetes, hypertension, hyperlipidemia, obesity who presents to [facility] with abdominal pain. Patient reports bilateral lower abdominal pain that started on Wednesday. He describes it as a burning pain that was as bad as a 7 out of 10. Pain came and went and was not related to bowel movements or eating. He decided to come into the hospital today since it was not getting better. Denies chest pain, shortness of breath, fever, nausea, vomiting, diarrhea, constipation, dysuria. \" CT scan showed diverticulitis. Family / Caregiver Present: No  Referring Practitioner: Joanie Crowder MD  Referral Date : 07/11/20  Diagnosis: Diverticulitis  General Comment  Comments: Patient goes by \"Hot Arturo\" or Arturo  Subjective  Subjective: Patient awake in bed upon arrival. In good spirits and immediately reports he is feeling much better than yesterday. He reports very minor pain at this time in lower abdomen that he rates at a 1-2/10. He reports he did not sleep well secondary to disruptive patient next door kept him up most of the night.           Orientation  Orientation  Overall Orientation Status: Within Normal Limits  Orientation Level: Oriented to place;Oriented to person;Oriented to situation(new month, but stated it was 2002)     Social/Functional History  Social/Functional History  Lives With: Alone  Type of Home: Apartment(senior living community)  Home Layout: Multi-level  Home Access: [address](Patient reports he access to walker or canes if needed from [facility])  ADL Assistance: Independent  Homemaking Assistance: Needs assistance(patient reports he loves to cook. Daughter does help with cleaning, but kalanin reports he is independent with everything else)  Vacuuming: Total  Cleaning: Moderate  Ambulation Assistance: Independent(Patient reports ambulation is very painful secondary to work injury approx 5 years ago, but he does not use an assistive device to walk)  Transfer Assistance: Independent  Active : Yes  Mode of Transportation: Truck(small truck, S10)  Occupation: On disability  Type of occupation: Worked for [employer]  Leisure & Hobbies: [employer]    Objective     Observation/Palpation  Observation: on telemtry, IV site in right forearm      RLE AROM: WFL  LLE AROM : WFL    RUE AROM : WFL  LUE AROM : WFL    Strength RLE: WFL  Comment: grossly 4+/5 - 5/5    Strength LLE: WFL  Comment: grossly 4+/5 - 5/5    RLE Tone: Normotonic  LLE Tone: Normotonic  Motor Control  Gross Motor?: WFL  Coordination  Rapid Alternating Movements: Normal       Bed mobility  Rolling to Right: Modified independent  Supine to Sit: Modified independent  Scooting: Modified independent  Comment: bed mobility done with HOB elevated on hospital bed. He does have a hospital bed at home. Transfers  Sit to Stand: Independent  Stand to sit: Independent  Bed to Chair: Independent(without device)    Ambulation  Ambulation?: Yes  More Ambulation?: No  Ambulation 1  Surface: level tile  Device: No Device  Assistance: Independent  Quality of Gait: Patient steady without a device. He is able to transition on and off carpet without difficulties  Gait Deviations: Slow Arabella  Distance: 200' with multiple turns  Comments: Patient used bathroom to urinate in standing.  He was independent and presented with good balance    Stairs/Curb  Stairs?: No(patient reports he hasn't done steps in awhile and does not have any need to do any)       Balance  Posture: Good  Sitting - Static: Good  Sitting - Dynamic: Good  Standing - Static: Good  Standing - Dynamic: Good        Plan    No further therapy needed at this time  Safety Devices  Type of devices:  All fall risk precautions in place, Nurse notified, Call light within reach(seated at [facility] with RN present)  Restraints  Initially in place: No      AM-PAC Score  AM-PAC Inpatient Mobility Raw Score : 24 (07/12/20 0942)  AM-PAC Inpatient T-Scale Score : 61.14 (07/12/20 0942)  Mobility Inpatient CMS 0-100% Score: 0 (07/12/20 0942)  Mobility Inpatient CMS G-Code Modifier : [facility] (07/12/20 9273)          Goals  No goals set secondary to discharged from therapy at this time      Therapy Time   Individual Concurrent Group Co-treatment   Time In 0910         Time Out 0940         Minutes 30                 Electronically signed by Ilan Mclean PT on 7/12/2020 at 9:45 AM

## 2020-07-12 NOTE — PROGRESS NOTES
Pt AAO x4.  C/o chronic pain in lower back. Medicated with PRN Percocet. Assessment completed and charted. Flagyl connected to IV site. All pt's questions answered. Denies needs at this time. Call light in reach. Will monitor.

## 2020-07-12 NOTE — PROGRESS NOTES
Clinical Pharmacy Note  Medication Counseling    Reviewed new medications started during hospital admission: Ciprofloxacin, Metronidazole. Indications and side effects were emphasized during counseling. All medication-related questions addressed. Patient verbalized understanding of education. Should the patient express any additional questions or concerns regarding their medications, please do not hesitate to contact the pharmacy department. Patient/caregiver aware they may refuse medications during hospital stay. 5 minutes spent educating patient regarding medications.   Roberta Holley RPh 7/12/2020 1:48 PM

## 2020-07-12 NOTE — PLAN OF CARE
Problem: Pain:  Goal: Pain level will decrease  Description: Pain level will decrease  7/12/2020 1053 by Sharon Gastelum RN  Outcome: Met This Shift  Note: Patient is alert and oriented X4. Patient is able to identify the sensation of pain and communicate the need for pain medication appropriately. 7/12/2020 0123 by Christian Mayo RN  Outcome: Ongoing  Goal: Control of acute pain  Description: Control of acute pain  7/12/2020 1053 by Sharon Gastelum RN  Outcome: Met This Shift  Note: Patient has been assessed for pain on a regular bases, patient has been given pain medication as appropriate, patient has been reassessed for pain after medication has been administered  7/12/2020 0123 by Christian Mayo RN  Outcome: Ongoing  Goal: Control of chronic pain  Description: Control of chronic pain  7/12/2020 1053 by Sharon Gastelum RN  Outcome: Met This Shift  Note: Chronic pain to the back is controled with PRN pain medication  7/12/2020 0123 by Christian Mayo RN  Outcome: Ongoing     Problem: Falls - Risk of:  Goal: Will remain free from falls  Description: Will remain free from falls  7/12/2020 1053 by Sharon Gastelum RN  Outcome: Met This Shift  Note: No falls noted this shift. Patient ambulate without difficulty. Bed kept in low position. Safe environment maintained. Bedside table & call light in reach. Uses call light appropriately when needing assistance. 7/12/2020 0123 by Christian Mayo RN  Outcome: Ongoing  Goal: Absence of physical injury  Description: Absence of physical injury  7/12/2020 1053 by Sharon Gastelum RN  Outcome: Met This Shift  Note: No physical injury noted. Patient has been assessed for fall risk, fall precautions are in place, environment has been cleared of obstacles and reassessed during rounding, bed is in lowest position with the wheels locked, call light is within reach.  ID band has been verified, appropriate transfer methods have been utilized and patient has been educated on safety devices   7/12/2020 0123 by Ebony Carlson RN  Outcome: Ongoing

## 2020-07-12 NOTE — DISCHARGE SUMMARY
Hospitalist Discharge Summary    Patient ID:  Juliann Howard  [id number]  60 y.o.  1960    Admit date: 7/11/2020    Discharge date: 7/12/2020    Disposition: home    Admission Diagnoses:   Patient Active Problem List   Diagnosis    Tobacco abuse    Hyperlipidemia    Diabetes mellitus, type 2 ([address]    Chronic low back pain    Chest pain    DM hyperosmolarity type II, uncontrolled ([address]    Morbid obesity due to excess calories ([address]    Diverticulitis       Discharge Diagnoses: Active Problems:    Diverticulitis  Resolved Problems:    * No resolved hospital problems. *      Code Status:  Full Code    Condition:  Stable    Discharge Diet: Diet:  DIET GENERAL;    PCP to do list:  Routine follow up    Hospital Course:     Diverticulitis  Presented with lower abdominal pain for 3 days prior to admission. No fevers, nausea, vomiting, diarrhea. Labs in the ED with mild leukocytosis of 12. CT scan with diverticulitis, possible developing abscess or microperforation.  No peritoneal signs. Started on IV Cipro/Flagyl. General surgery consulted given possibility of abscess formation. Patient with significant clinical improvement the day after admission, evaluated by surgery, no need for surgical intervention and okay for discharge home to complete course of oral Cipro Flagyl     COPD  Stable, not in acute exacerbation. Continue home meds    DM  A1c 7.9. Continue home meds.     Hypertension  Stable. Continue home meds     Hyperlipidemia  Continue statin.      Morbid  Obesity  Body mass index is 40.21 kg/m². Counseled on effects of weight on overall health and chronic medical conditions and discussed weight loss.      Tobacco abuse  Smokes 1 pack a day, down from 2. NRT.       Discharge Medications:   Current Discharge Medication List      START taking these medications    Details   ciprofloxacin (CIPRO) 500 MG tablet Take 1 tablet by mouth 2 times daily for 7 days  Qty: 14 tablet, Refills: 0 metroNIDAZOLE (FLAGYL) 500 MG tablet Take 1 tablet by mouth 2 times daily for 7 days  Qty: 14 tablet, Refills: 0           Current Discharge Medication List        Current Discharge Medication List      CONTINUE these medications which have NOT CHANGED    Details   loratadine (CLARITIN) 10 MG tablet Take 1 tablet by mouth daily  Qty: 15 tablet, Refills: 0      Dulaglutide 0.75 MG/0.5ML SOPN Inject 0.75 mg into the skin      oxyCODONE-acetaminophen (PERCOCET) 5-325 MG per tablet Take 1 tablet by mouth every 8 hours as needed. metoprolol (LOPRESSOR) 25 MG tablet Take 1 tablet by mouth 2 times daily  Qty: 60 tablet, Refills: 3      aspirin EC 81 MG EC tablet Take 1 tablet by mouth daily  Qty: 30 tablet, Refills: 11    Associated Diagnoses: Diabetes mellitus, type 2 ([facility])      albuterol sulfate  (90 BASE) MCG/ACT inhaler Inhale 2 puffs into the lungs every 6 hours as needed for Wheezing  Qty: 1 Inhaler, Refills: 2    Comments: \"ventolin\"  Associated Diagnoses: COPD (chronic obstructive pulmonary disease) ([facility])      fluticasone-salmeterol (ADVAIR DISKUS) 500-50 MCG/DOSE diskus inhaler Inhale 1 puff into the lungs every 12 hours  Qty: 60 each, Refills: 2      metFORMIN (GLUCOPHAGE) 500 MG tablet Take 1 tablet by mouth 2 times daily (with meals)  Qty: 180 tablet, Refills: 0    Associated Diagnoses: Diabetes mellitus, type 2 ([facility])      atorvastatin (LIPITOR) 40 MG tablet Take 1 tablet by mouth daily  Qty: 30 tablet, Refills: 2    Comments: To replace the Mevacor  Associated Diagnoses: Hyperlipidemia      Glucose Blood (BLOOD GLUCOSE TEST STRIPS) STRP Use twice daily  Qty: 60 strip, Refills: 11    Associated Diagnoses: Diabetes mellitus, type 2 ([facility])      lisinopril (PRINIVIL) 20 MG tablet Take 1 tablet by mouth daily.   Qty: 30 tablet, Refills: 3      Lancets MISC Use twice per day  Qty: 60 each, Refills: 11    Associated Diagnoses: Diabetes mellitus, type 2 ([facility])      meloxicam (MOBIC) 15 MG tablet Take 1 tablet by mouth daily. Qty: 30 tablet, Refills: 0    Associated Diagnoses: Chronic low back pain      nicotine (NICODERM CQ) 21 MG/24HR Place 1 patch onto the skin every 24 hours. Qty: 30 patch, Refills: 0    Associated Diagnoses: COPD (chronic obstructive pulmonary disease) ([facility] [address] Tobacco abuse           Current Discharge Medication List          Procedures: None     Assessment on Discharge: Stable, improved     Discharge Exam:  /86   Pulse 63   Temp 97.4 °F (36.3 °C) (Oral)   Resp 14   Ht 5' 6\" (1.676 m)   Wt 249 lb 5.4 oz (113.1 kg)   SpO2 97%   BMI 40.24 kg/m²     General appearance: No apparent distress, appears stated age and cooperative. Obese  HEENT: Pupils equal, round, and reactive to light. Conjunctivae/corneas clear. Neck: Supple, with full range of motion. Trachea midline. Respiratory:  Normal respiratory effort. Clear to auscultation, bilaterally without Rales/Wheezes/Rhonchi. Cardiovascular: Regular rate and rhythm with normal S1/S2 without murmurs, rubs or gallops. Abdomen: Soft, non-tender, non-distended with normal bowel sounds. Musculoskelatal: No clubbing, cyanosis or edema bilaterally. Full range of motion without deformity. Skin: Skin color, texture, turgor normal.  No rashes or lesions. Neurologic:  Neurovascularly intact without any focal sensory/motor deficits.  Cranial nerves: II-XII intact, grossly non-focal.  Psychiatric: Alert and oriented, thought content appropriate, normal insight    Pertinent Studies During Hospital Stay:    Radiology:  Xr Chest Standard (2 Vw)    Result Date: 7/11/2020  EXAMINATION: TWO XRAY VIEWS OF THE CHEST 7/11/2020 11:08 am COMPARISON: 02/18/2016 HISTORY: ORDERING SYSTEM PROVIDED HISTORY: upper abd pain TECHNOLOGIST PROVIDED HISTORY: Reason for exam:->upper abd pain Reason for Exam: UPPER ABDOMEN PAIN SINCE WEDS DENIES COUGH   HX COPD DIABETES Acuity: Acute Type of Exam: Initial FINDINGS: Stable appearing hyperinflation suggesting underlying COPD, please correlate with smoking history. The lungs are without acute focal process. No effusion or pneumothorax. The cardiomediastinal silhouette is stable. The osseous structures are intact without acute process. Stable appearing chest without acute process. Ct Abdomen Pelvis W Iv Contrast Additional Contrast? None    Result Date: 7/11/2020  EXAMINATION: CT OF THE ABDOMEN AND PELVIS WITH CONTRAST 7/11/2020 11:09 am TECHNIQUE: CT of the abdomen and pelvis was performed with the administration of intravenous contrast. Multiplanar reformatted images are provided for review. Dose modulation, iterative reconstruction, and/or weight based adjustment of the mA/kV was utilized to reduce the radiation dose to as low as reasonably achievable. COMPARISON: None. HISTORY: ORDERING SYSTEM PROVIDED HISTORY: abd pain TECHNOLOGIST PROVIDED HISTORY: If patient is on cardiac monitor and/or pulse ox, they may be taken off cardiac monitor and pulse ox, left on O2 if currently on. All monitors reattached when patient returns to room. Additional Contrast?->None Reason for exam:->abd pain Reason for Exam: UPPER AND LOWER ABDOMINAL PAIN SINCE WEDS  HX HERNIA REPAIR Acuity: Acute Type of Exam: Initial FINDINGS: Lower Chest: No acute abnormality within the lungs. Coronary calcifications. Calcified right hilar lymph nodes, consistent with prior granulomatous disease Organs: The liver diffusely hypoattenuating with focal fatty sparing along the gallbladder fossa. No focal hepatic lesion in the visualized hepatic segments. The spleen, gallbladder, pancreas, adrenals, and kidneys are within normal limits. There is a retroaortic left renal vein. Antonio Clementine GI/Bowel: Stomach is nondistended. The small amount of nondilated. The colon is normal in caliber. There are multiple colonic diverticula and with adjacent fluid and stranding in the sigmoid colon (for example axial image 141).   There is a focal gas and fluid containing BUN 12 7 - 20 mg/dL    CREATININE 0.6 (L) 0.8 - 1.3 mg/dL    GFR Non-African American >60 >60    GFR African American >60 >60    Calcium 8.9 8.3 - 10.6 mg/dL    Phosphorus 3.1 2.5 - 4.9 mg/dL    Alb 3.5 3.4 - 5.0 g/dL   POCT Glucose    Collection Time: 07/12/20  6:23 AM   Result Value Ref Range    POC Glucose 141 (H) 70 - 99 mg/dl    Performed on ACCU-CHEK    POCT Glucose    Collection Time: 07/12/20 11:34 AM   Result Value Ref Range    POC Glucose 156 (H) 70 - 99 mg/dl    Performed on ACCU-CHEK          Follow up: with KENISHA Torres CNP    Note that more  than 30 minutes was spent in preparing discharge papers, discussing discharge with patient, medication review, etc.    Thank you KENISHA Torres CNP for the opportunity to be involved in this patient's care. If you have any questions or concerns please feel free to contact me at [phone]. Electronically signed by Dalila Connors MD on 7/12/2020 at 12:57 PM    This note was transcribed using [id number] [employer]. Please disregard any translational errors.

## 2020-07-12 NOTE — PROGRESS NOTES
appearance: No apparent distress, appears stated age and cooperative. Obese  HEENT: Pupils equal, round, and reactive to light. Conjunctivae/corneas clear. Neck: Supple, with full range of motion. Trachea midline. Respiratory:  Normal respiratory effort. Clear to auscultation, bilaterally without Rales/Wheezes/Rhonchi. Cardiovascular: Regular rate and rhythm with normal S1/S2 without murmurs, rubs or gallops. Abdomen: Soft, non-tender, non-distended with normal bowel sounds. Musculoskeletal: No clubbing, cyanosis or edema bilaterally. Full range of motion without deformity. Skin: Skin color, texture, turgor normal.  No rashes or lesions. Neurologic:  Neurovascularly intact without any focal sensory/motor deficits. Cranial nerves: II-XII intact, grossly non-focal.  Psychiatric: Alert and oriented, thought content appropriate, normal insight  Capillary Refill: Brisk,< 3 seconds   Peripheral Pulses: +2 palpable, equal bilaterally       Labs:   Recent Labs     07/11/20  1040 07/12/20  0425   WBC 12.3* 11.9*   HGB 17.6* 17.2   HCT 55.1* 52.4    226     Recent Labs     07/11/20  1040 07/12/20  0425    138   K 4.6 4.2    98*   CO2 25 26   BUN 16 12   CREATININE 0.6* 0.6*   CALCIUM 9.2 8.9   PHOS  --  3.1     Recent Labs     07/11/20  1040   AST 13*   ALT 16   BILITOT 0.5   ALKPHOS 86     No results for input(s): INR in the last 72 hours. Recent Labs     07/11/20  1040   TROPONINI <0.01       Urinalysis:      Lab Results   Component Value Date    NITRU Negative 07/11/2020    BLOODU Negative 07/11/2020    SPECGRAV 1.025 07/11/2020    GLUCOSEU >=1000 07/11/2020       Radiology:  CT ABDOMEN PELVIS W IV CONTRAST Additional Contrast? None   Final Result   1. Sigmoid diverticulitis. Adjacent gas and fluid containing collection is   most consistent in appearance with a prominent diverticulum, but early   abscess cannot be excluded. Attention at follow-up is recommended. 2. Hepatic steatosis. XR CHEST STANDARD (2 VW)   Final Result   Stable appearing chest without acute process. Assessment/Plan:    Active Hospital Problems    Diagnosis    Diverticulitis [K57.92]     Diverticulitis  CT scan with diverticulitis, possible developing abscess or microperforation. No peritoneal signs  -Cipro Flagyl  -General surgery consulted--pending surgery recommendations may be able to discharge later today on Cipro Flagyl, significant clinical improvement and no evidence of peritonitis     COPD  Stable, not in acute exacerbation. - continue home meds           Diabetes Mellitus Type II   Lab Results   Component Value Date     LABA1C 7.9 01/08/2016           Lab Results   Component Value Date     .0 01/08/2016      Well controlled. - hold home oral hypoglycemic agents  - basal bolus insulin  - FSBG qac and qhs; hypoglycemic protocol     Hypertension  Stable. - continue home meds     Hyperlipidemia  Continue statin.      Morbid  Obesity  Body mass index is 40.21 kg/m². Counseled on effects of weight on overall health and chronic medical conditions and discussed weight loss.      Tobacco abuse  Smokes 1 pack a day, down from 2.  -Nicotine replacement therapy      DVT Prophylaxis: Lovenox  Diet: DIET CLEAR LIQUID;  Code Status: Full Code    PT/OT Eval Status: ordered    Dispo - pending    Yazmin Keys MD    This note was transcribed using [id number] [employer]. Please disregard any translational errors.

## 2020-07-12 NOTE — CARE COORDINATION
7/12  Discharge order noted-- spoke w/ daughter Nehemiah Butcher- confirms plan for patient to return home where he lives alone in an apartment- has family close by . Patient independent with care.  Daughter will transport home  PCP- [facility](?) in [place]  Electronically signed by Jessi Wheeler on 7/12/2020 at 1:17 PM

## 2020-07-15 LAB — BLOOD CULTURE, ROUTINE: NORMAL

## 2022-04-04 ENCOUNTER — HOSPITAL ENCOUNTER (OUTPATIENT)
Dept: GENERAL RADIOLOGY | Age: 62
Discharge: HOME | End: 2022-04-04
Payer: MEDICARE

## 2022-04-04 ENCOUNTER — HOSPITAL ENCOUNTER (OUTPATIENT)
Age: 62
Discharge: HOME | End: 2022-04-04
Payer: MEDICARE

## 2022-04-04 DIAGNOSIS — R10.9 ABDOMINAL PAIN, UNSPECIFIED ABDOMINAL LOCATION: ICD-10-CM

## 2022-04-04 PROCEDURE — 74019 RADEX ABDOMEN 2 VIEWS: CPT

## 2022-08-26 ENCOUNTER — APPOINTMENT (OUTPATIENT)
Dept: GENERAL RADIOLOGY | Age: 62
End: 2022-08-26
Payer: MEDICARE

## 2022-08-26 ENCOUNTER — HOSPITAL ENCOUNTER (EMERGENCY)
Age: 62
Discharge: HOME | End: 2022-08-26
Attending: EMERGENCY MEDICINE
Payer: MEDICARE

## 2022-08-26 ENCOUNTER — TELEPHONE (OUTPATIENT)
Dept: ORTHOPEDIC SURGERY | Age: 62
End: 2022-08-26

## 2022-08-26 VITALS
HEART RATE: 79 BPM | WEIGHT: 219 LBS | SYSTOLIC BLOOD PRESSURE: 152 MMHG | OXYGEN SATURATION: 94 % | HEIGHT: 67 IN | RESPIRATION RATE: 16 BRPM | TEMPERATURE: 97.9 F | DIASTOLIC BLOOD PRESSURE: 95 MMHG | BODY MASS INDEX: 34.37 KG/M2

## 2022-08-26 DIAGNOSIS — M25.512 ACUTE PAIN OF LEFT SHOULDER: Primary | ICD-10-CM

## 2022-08-26 PROCEDURE — 6370000000 HC RX 637 (ALT 250 FOR IP): Performed by: EMERGENCY MEDICINE

## 2022-08-26 PROCEDURE — 73030 X-RAY EXAM OF SHOULDER: CPT

## 2022-08-26 PROCEDURE — 6360000002 HC RX W HCPCS: Performed by: EMERGENCY MEDICINE

## 2022-08-26 PROCEDURE — 99283 EMERGENCY DEPT VISIT LOW MDM: CPT

## 2022-08-26 RX ORDER — LIDOCAINE 50 MG/G
1 PATCH TOPICAL DAILY
Qty: 10 PATCH | Refills: 0 | Status: SHIPPED | OUTPATIENT
Start: 2022-08-26 | End: 2022-08-26

## 2022-08-26 RX ORDER — LIDOCAINE 50 MG/G
1 PATCH TOPICAL DAILY
Qty: 10 PATCH | Refills: 0 | Status: SHIPPED | OUTPATIENT
Start: 2022-08-26 | End: 2022-09-05

## 2022-08-26 RX ORDER — IBUPROFEN 600 MG/1
600 TABLET ORAL ONCE
Status: COMPLETED | OUTPATIENT
Start: 2022-08-26 | End: 2022-08-26

## 2022-08-26 RX ORDER — KETOROLAC TROMETHAMINE 30 MG/ML
15 INJECTION, SOLUTION INTRAMUSCULAR; INTRAVENOUS ONCE
Status: DISCONTINUED | OUTPATIENT
Start: 2022-08-26 | End: 2022-08-26

## 2022-08-26 RX ORDER — LIDOCAINE 4 G/G
1 PATCH TOPICAL DAILY
Status: DISCONTINUED | OUTPATIENT
Start: 2022-08-26 | End: 2022-08-26 | Stop reason: HOSPADM

## 2022-08-26 RX ADMIN — IBUPROFEN 600 MG: 600 TABLET ORAL at 08:50

## 2022-08-26 ASSESSMENT — PAIN DESCRIPTION - PAIN TYPE
TYPE: ACUTE PAIN
TYPE: ACUTE PAIN

## 2022-08-26 ASSESSMENT — PAIN DESCRIPTION - ORIENTATION
ORIENTATION: LEFT
ORIENTATION: LEFT

## 2022-08-26 ASSESSMENT — PAIN DESCRIPTION - FREQUENCY
FREQUENCY: CONTINUOUS
FREQUENCY: CONTINUOUS

## 2022-08-26 ASSESSMENT — PAIN SCALES - GENERAL
PAINLEVEL_OUTOF10: 7
PAINLEVEL_OUTOF10: 7
PAINLEVEL_OUTOF10: 4
PAINLEVEL_OUTOF10: 7

## 2022-08-26 ASSESSMENT — PAIN DESCRIPTION - DESCRIPTORS
DESCRIPTORS: ACHING
DESCRIPTORS: ACHING

## 2022-08-26 ASSESSMENT — PAIN DESCRIPTION - LOCATION
LOCATION: SHOULDER
LOCATION: SHOULDER

## 2022-08-26 ASSESSMENT — PAIN - FUNCTIONAL ASSESSMENT
PAIN_FUNCTIONAL_ASSESSMENT: 0-10
PAIN_FUNCTIONAL_ASSESSMENT: 0-10

## 2022-08-26 NOTE — DISCHARGE INSTRUCTIONS
Take ibuprofen, up to 600 mg four times per day every day with food for the next 3-5 days, then as needed and directed on the bottle for continued pain. You may alternate this with up to 1000 mg of acetaminophen (Tylenol), every six hours. Do not take more than 4000 mg of acetaminophen from all sources in a 24-hour period. Lidocaine patches, as prescribed.

## 2022-08-26 NOTE — TELEPHONE ENCOUNTER
SPOKE WITH PATIENT. AT THIS TIME HE WOULD LIKE TO SCHEDULE A F/U WITH HIS PCP. I LET HIM KNOW HE WOULD HAVE TO REACH OUT TO THEIR OFFICE TO SCHEDULE THAT APPT. ALL QUESTIONS ANSWERED. PATIENT WILL CALL PCP TO SCHEDULE APPT. PATIENT EXPRESSED UNDERSTANDING.

## 2022-08-26 NOTE — ED NOTES
Gave patient discharge instructions. He states, understanding.  Patient discharged to home     Akhil Friedman RN  08/26/22 [address], RN  08/26/22 [id number]

## 2022-08-26 NOTE — ED NOTES
Patient refused Toradol shot. States, he doesn't like shots. He has never had a shot in his life.  Informed MD Tyrell Viramontes RN  08/26/22 [id number]

## 2022-08-26 NOTE — TELEPHONE ENCOUNTER
----- Message from Aron Quevedo MD sent at 8/26/2022 11:44 AM EDT -----  Please call this patient and offer a visit for Monday. Thanks    ----- Message -----  From: Arnaud Arroyo MD  Sent: 8/26/2022   9:15 AM EDT  To:  Aron Quevedo MD

## 2022-08-26 NOTE — ED PROVIDER NOTES
[facility]    CHIEF COMPLAINT  Shoulder Pain (C/o left shoulder pain x 2 days. Hurts with movement. He lifted heavy coolers 2 days ago.)       HISTORY OF PRESENT ILLNESS  Nestor Salinas is a 57 y.o. male who presents to the ED with complaint of left shoulder pain. The patient lifted a heavy cooler 2 days ago. Since then he has had worsening pain at the anterior left shoulder. The pain is 7 out of 10 in intensity, dull, worse with movement, better with rest, radiates down the arm, no associated symptoms. Denies any trauma to the shoulder. No other complaints, modifying factors or associated symptoms.      I have reviewed the following from the nursing documentation:    Past Medical History:   Diagnosis Date    Arthritis     Chronic low back pain     COPD (chronic obstructive pulmonary disease) ([facility])     Diabetes mellitus ([facility] [address]     History of colon polyps 2012    Hyperlipidemia     Hypertension     Obesity     Tobacco abuse      Past Surgical History:   Procedure Laterality Date    UMBILICAL HERNIA REPAIR  2011     Family History   Problem Relation Age of Onset    Diabetes Mother     Stroke Mother     Hypertension Mother     Other Mother         DVT     Social History     Socioeconomic History    Marital status:      Spouse name: Not on file    Number of children: Not on file    Years of education: Not on file    Highest education level: Not on file   Occupational History    Occupation: disabled ([employer])   Tobacco Use    Smoking status: Every Day     Packs/day: 1.50     Years: 30.00     Pack years: 45.00     Types: Cigarettes    Smokeless tobacco: Never   Vaping Use    Vaping Use: Never used   Substance and Sexual Activity    Alcohol use: No    Drug use: No    Sexual activity: Not Currently   Other Topics Concern    Not on file   Social History Narrative    Not on file     Social Determinants of Health     Financial Resource Strain: Not on file   Food Insecurity: Not on file Transportation Needs: Not on file   Physical Activity: Not on file   Stress: Not on file   Social Connections: Not on file   Intimate Partner Violence: Not on file   Housing Stability: Not on file     Current Facility-Administered Medications   Medication Dose Route Frequency Provider Last Rate Last Admin    lidocaine 4 % external patch 1 patch  1 patch TransDERmal Daily Arnoldo Brooks MD   1 patch at 08/26/22 7685     Current Outpatient Medications   Medication Sig Dispense Refill    lidocaine (LIDODERM) 5 % Place 1 patch onto the skin daily for 10 days 12 hours on, 12 hours off. May substitute for 4% for insurance or financial reasons. 10 patch 0    clotrimazole (LOTRIMIN) 1 % cream Apply topically 2 times daily Apply topically 2 times daily. DULoxetine (CYMBALTA) 20 MG extended release capsule Take 20 mg by mouth daily      dapagliflozin (FARXIGA) 10 MG tablet Take 10 mg by mouth every morning      FLUTICASONE FUROATE IN Inhale 250 mcg into the lungs      gabapentin (NEURONTIN) 300 MG capsule Take 300 mg by mouth 3 times daily.       insulin glargine (LANTUS) 100 UNIT/ML injection vial Inject into the skin nightly      losartan (COZAAR) 50 MG tablet Take 50 mg by mouth 2 times daily      pantoprazole (PROTONIX) 20 MG tablet Take 20 mg by mouth daily      loratadine (CLARITIN) 10 MG tablet Take 1 tablet by mouth daily 15 tablet 0    Dulaglutide 0.75 MG/0.5ML SOPN Inject 0.75 mg into the skin      aspirin EC 81 MG EC tablet Take 1 tablet by mouth daily 30 tablet 11    albuterol sulfate  (90 BASE) MCG/ACT inhaler Inhale 2 puffs into the lungs every 6 hours as needed for Wheezing 1 Inhaler 2    metFORMIN (GLUCOPHAGE) 500 MG tablet Take 1 tablet by mouth 2 times daily (with meals) (Patient taking differently: Take 500 mg by mouth 3 times daily (before meals)) 180 tablet 0    atorvastatin (LIPITOR) 40 MG tablet Take 1 tablet by mouth daily (Patient taking differently: Take 80 mg by mouth daily) 30 tablet 2    Glucose Blood (BLOOD GLUCOSE TEST STRIPS) STRP Use twice daily 60 strip 11    Lancets MISC Use twice per day 60 each 11     Allergies   Allergen Reactions    Oxycontin [Oxycodone Hcl] Other (See Comments)     Side effects \"sees stars\"       REVIEW OF SYSTEMS  10 systems reviewed, pertinent positives and negatives per HPI, otherwise noted to be negative. PHYSICAL EXAM  ED Triage Vitals [08/26/22 0815]   BP Temp Temp Source Heart Rate Resp SpO2 Height Weight   (!) 152/95 97.9 °F (36.6 °C) Oral 79 16 94 % 5' 7\" (1.702 m) 219 lb (99.3 kg)     General appearance: Awake and alert. Cooperative. No acute distress. HENT: Normocephalic. Atraumatic. Mucous membranes are moist.  Neck: Supple. Eyes: PERRL. EOMI. Heart/Chest: RRR. Lungs: Respirations unlabored. Speaking comfortably in full sentences. Abdomen: Non-distended. Musculoskeletal: No extremity edema. No deformity. There is mild tenderness to palpation at the biceps insertion at the left shoulder. Full passive range of motion of the shoulder. Active range of motion is limited secondary to pain. Left cardinal hand functions are intact. Sensation is intact R/M/U.  2+ radial pulse. Skin: Warm and dry. No acute rashes. Neurological: Alert and oriented. CN II-XII intact. Gait normal.  Psychiatric: Mood/affect: normal      LABS  I have reviewed all labs for this visit. No results found for this visit on 08/26/22. RADIOLOGY  I have reviewed all radiographic studies for this visit. XR SHOULDER LEFT (MIN 2 VIEWS)   Final Result   No acute osseous injuries. Calcifications adjacent to the greater tuberosity. Calcific tendinitis   versus enthesopathy. Mild osteoarthritis of the acromioclavicular and glenohumeral joints. ED COURSE/MDM  Patient seen and evaluated. Old records reviewed. Labs and imaging reviewed and results discussed with patient/family to extent possible.       This is a 68-year-old male who presents with pain at the left shoulder at the site of the bicep tendon insertion. Likely a strain after lifting a heavy object. The limb is neurovascularly intact. X-ray of the shoulder reveals no acute bony abnormality. There is no fever, overlying erythema, or significant pain with passive range of motion-not consistent with septic arthritis. Pain control with lidocaine patch and Toradol. Discharged with instructions to rest, ice, NSAIDs, lidocaine patch. Follow-up with orthopedics in 1 week as needed. Usual strict return precautions communicated    Is this patient to be included in the SEP-1 Core Measure? No   Exclusion criteria - the patient is NOT to be included for SEP-1 Core Measure due to: Infection is not suspected    ILove MD, am the primary clinician of record. During the patient's ED course, the patient was given:  Medications   lidocaine 4 % external patch 1 patch (1 patch TransDERmal Patch Applied 8/26/22 0824)   ibuprofen (ADVIL;MOTRIN) tablet 600 mg (600 mg Oral Given 8/26/22 0850)        All questions were answered and the patient/family expressed understanding and agreement with the plan. PROCEDURES  None    CRITICAL CARE  N/A    CLINICAL IMPRESSION  1. Acute pain of left shoulder        DISPOSITION   discharge     Love Julian MD    Note: This chart was created using voice recognition dictation software. Efforts were made by me to ensure accuracy, however some errors may be present due to limitations of this technology and occasionally words are not transcribed correctly.        Love Julian MD  08/26/22 3086

## 2022-12-28 ENCOUNTER — OFFICE VISIT (OUTPATIENT)
Dept: PAIN MANAGEMENT | Age: 62
End: 2022-12-28

## 2022-12-28 VITALS
BODY MASS INDEX: 34.37 KG/M2 | HEART RATE: 100 BPM | OXYGEN SATURATION: 95 % | HEIGHT: 67 IN | WEIGHT: 219 LBS | SYSTOLIC BLOOD PRESSURE: 130 MMHG | DIASTOLIC BLOOD PRESSURE: 85 MMHG

## 2022-12-28 DIAGNOSIS — M47.816 ARTHROPATHY OF LUMBAR FACET JOINT: ICD-10-CM

## 2022-12-28 DIAGNOSIS — M53.86 DECREASED RANGE OF MOTION OF LUMBAR SPINE: ICD-10-CM

## 2022-12-28 DIAGNOSIS — M47.817 LUMBOSACRAL SPONDYLOSIS WITHOUT MYELOPATHY: ICD-10-CM

## 2022-12-28 DIAGNOSIS — M19.90 ARTHRITIS: ICD-10-CM

## 2022-12-28 DIAGNOSIS — Z51.81 ENCOUNTER FOR THERAPEUTIC DRUG MONITORING: ICD-10-CM

## 2022-12-28 DIAGNOSIS — G89.4 CHRONIC PAIN SYNDROME: ICD-10-CM

## 2022-12-28 DIAGNOSIS — M51.36 DDD (DEGENERATIVE DISC DISEASE), LUMBAR: Primary | ICD-10-CM

## 2022-12-28 DIAGNOSIS — M47.816 LUMBAR SPONDYLOSIS: ICD-10-CM

## 2022-12-28 RX ORDER — ATORVASTATIN CALCIUM 80 MG/1
TABLET, FILM COATED ORAL
COMMUNITY
Start: 2022-10-06

## 2022-12-28 RX ORDER — DULOXETIN HYDROCHLORIDE 60 MG/1
CAPSULE, DELAYED RELEASE ORAL
COMMUNITY
Start: 2022-10-06

## 2022-12-28 RX ORDER — PREDNISOLONE ACETATE 10 MG/ML
SUSPENSION/ DROPS OPHTHALMIC
COMMUNITY

## 2022-12-28 RX ORDER — OXYCODONE HYDROCHLORIDE AND ACETAMINOPHEN 5; 325 MG/1; MG/1
1 TABLET ORAL EVERY 8 HOURS PRN
Qty: 84 TABLET | Refills: 0 | Status: SHIPPED | OUTPATIENT
Start: 2022-12-28 | End: 2023-01-25

## 2022-12-28 RX ORDER — LOSARTAN POTASSIUM 100 MG/1
TABLET ORAL
COMMUNITY
Start: 2022-11-29

## 2022-12-28 RX ORDER — FLUTICASONE PROPIONATE AND SALMETEROL 250; 50 UG/1; UG/1
POWDER RESPIRATORY (INHALATION)
COMMUNITY
Start: 2022-10-06

## 2022-12-28 RX ORDER — DORZOLAMIDE HCL 20 MG/ML
SOLUTION/ DROPS OPHTHALMIC
COMMUNITY

## 2022-12-28 RX ORDER — FLASH GLUCOSE SENSOR
KIT MISCELLANEOUS
COMMUNITY
Start: 2022-10-06

## 2022-12-28 RX ORDER — CLOTRIMAZOLE 1 %
CREAM (GRAM) TOPICAL
COMMUNITY

## 2022-12-28 RX ORDER — DULAGLUTIDE 1.5 MG/.5ML
1.5 INJECTION, SOLUTION SUBCUTANEOUS
COMMUNITY

## 2022-12-28 RX ORDER — CELECOXIB 200 MG/1
CAPSULE ORAL
COMMUNITY
Start: 2022-11-17

## 2022-12-28 RX ORDER — GABAPENTIN 300 MG/1
300 CAPSULE ORAL 2 TIMES DAILY
Qty: 60 CAPSULE | Refills: 0 | Status: SHIPPED | OUTPATIENT
Start: 2022-12-28 | End: 2023-01-27

## 2022-12-28 RX ORDER — GABAPENTIN 800 MG/1
TABLET ORAL
COMMUNITY
Start: 2022-12-02 | End: 2022-12-28

## 2022-12-28 RX ORDER — PROPRANOLOL HCL 60 MG
CAPSULE, EXTENDED RELEASE 24HR ORAL
COMMUNITY

## 2022-12-28 NOTE — PROGRESS NOTES
HISTORY OF PRESENT ILLNESS:  Mr. Hazel Ferrera is a 57 y.o. male presents for consultation at the request of Dr. Yohana Abdullahi. His presenting problems are low back pain with minimal radicular symptoms. He has also been evaluated by PCP. Onset of pain began about 15 years ago. He rates the pain 8/10 and describes it as sharp, aching. Pain is greater in his low back than any other regions. Pain is made worse by: walking, sitting. Activities that have been limited by pain that he otherwise tolerated well are walking, home exercises, ADLs. Alternative therapies he has previously attempted are minimal. No history of injection treatments or surgery, history of PT without relief. . Current treatment regimen has helped relieve about 30% of the pain. Relieving factors of pain include ibuprofen. Hedenies side effects from the current pain regimen. Patient reports mood is content, well and sleep patterns are Poor. Patient deniesneurological bowel or bladder. Patient denies misusing/abusing his narcotic pain medications or using any illegal drugs. he admits to morning stiffness, fatigue, and denies headaches. Patient reports history of chronic pain for many years, PCP was managing pain. Previously patient was taking Percocet as needed and notes that he had excellent relief with this regimen. He states he would take 1 in the morning, 1 midday, and 1 in the evening and overall he felt more functional and able to be active in his life with less joint pain and stiffness. No history of lumbar injections however overall he is not keen on injections. He is diabetic and with concerns of hyperglycemia with epidural steroid injections. ROS:  Review of Systems   Constitutional:  Positive for fatigue. Negative for fever and unexpected weight change. HENT:  Negative for congestion and dental problem. Eyes:  Negative for visual disturbance. Respiratory:  Negative for chest tightness and shortness of breath.     Cardiovascular: Negative for chest pain, palpitations and leg swelling. Gastrointestinal:  Negative for constipation. Endocrine: Negative for polydipsia, polyphagia and polyuria. Musculoskeletal:  Positive for arthralgias, back pain and gait problem. Negative for neck pain and neck stiffness. Skin:  Negative for rash. Neurological:  Positive for weakness. Negative for dizziness, tremors and speech difficulty. Psychiatric/Behavioral:  Positive for sleep disturbance. Negative for self-injury and suicidal ideas. The patient is not nervous/anxious. Physical Exam  Constitutional:       Appearance: Normal appearance. HENT:      Head: Normocephalic and atraumatic. Right Ear: External ear normal.      Left Ear: External ear normal.      Mouth/Throat:      Mouth: Mucous membranes are moist.      Pharynx: Oropharynx is clear. Eyes:      General: No scleral icterus. Extraocular Movements: Extraocular movements intact. Conjunctiva/sclera: Conjunctivae normal.   Cardiovascular:      Rate and Rhythm: Normal rate and regular rhythm. Pulses: Normal pulses. Heart sounds: Normal heart sounds. No murmur heard. No gallop. Pulmonary:      Effort: Pulmonary effort is normal. No respiratory distress. Breath sounds: Normal breath sounds. No wheezing. Abdominal:      General: Abdomen is flat. Palpations: Abdomen is soft. Musculoskeletal:         General: Swelling (Palpable thoracolumbar fascia trigger points left lumbar region) and tenderness (Tenderness bilateral lumbar facets L3-S1 no tenderness to palpate SI joints) present. Cervical back: Normal range of motion and neck supple. Right lower leg: No edema. Left lower leg: No edema. Comments: Limited lumbar flexion extension related to pain   Skin:     General: Skin is warm and dry. Capillary Refill: Capillary refill takes 2 to 3 seconds. Findings: No lesion.    Neurological:      General: No focal deficit present. Mental Status: He is alert and oriented to person, place, and time. Mental status is at baseline. Sensory: Sensory deficit (Diminished sensation to light touch bilateral feet) present. Gait: Gait abnormal (Antalgic with PRN cane use). Deep Tendon Reflexes: Reflexes normal.   Psychiatric:         Mood and Affect: Mood normal.         Behavior: Behavior normal.         Thought Content: Thought content normal.         Judgment: Judgment normal.      /85   Pulse 100   Ht 5' 7\" (1.702 m)   Wt 219 lb (99.3 kg)   SpO2 95%   BMI 34.30 kg/m²      ASSESSMENT:    1. DDD (degenerative disc disease), lumbar    2. Encounter for therapeutic drug monitoring    3. Lumbar spondylosis    4. Chronic pain syndrome    5. Arthritis    6. Arthropathy of lumbar facet joint    7. Decreased range of motion of lumbar spine    8.  Lumbosacral spondylosis without myelopathy         Lab Results   Component Value Date    WBC 6.6 07/16/2022    HGB 16.3 07/16/2022    HCT 48.9 07/16/2022    MCV 88.1 07/16/2022     07/16/2022     Lab Results   Component Value Date/Time     07/16/2022 08:13 AM    K 4.5 07/16/2022 08:13 AM    K 4.6 07/11/2020 10:40 AM     07/16/2022 08:13 AM    CO2 27 07/16/2022 08:13 AM    BUN 12 07/16/2022 08:13 AM    CREATININE 0.6 07/16/2022 08:13 AM    GLUCOSE 263 07/16/2022 08:13 AM    CALCIUM 8.8 07/16/2022 08:13 AM      Lab Results   Component Value Date    TSH 3.52 01/08/2016       IMAGING:  EXAMINATION:   3 XRAY VIEWS OF THE LUMBAR SPINE       7/23/2019 11:06 am       COMPARISON:   Radiographs of the lumbar spine August 12, 2017       HISTORY:   ORDERING SYSTEM PROVIDED HISTORY: Inflammation of lumbar spine ([facility] [address]   TECHNOLOGIST PROVIDED HISTORY:   Reason for exam:->Arthropathy of lumbar spine   Reason for Exam: chronic low back pain worsening   Acuity: Chronic   Type of Exam: Initial   Relevant Medical/Surgical History: spondylopathy       FINDINGS:   There is no fracture or spondylolisthesis. Mild diffuse endplate   osteophytosis but no significant disc space narrowing. Moderate bilateral   lower lumbar spine facet degenerative changes are noted as well. Impression   Mild diffuse degenerative changes of the intervertebral discs. Moderate bilateral lower lumbar spine facet degenerative changes. No significant change in the appearance of the lumbar spine from August 2017       PLAN:   -Chronic opiate treatment protocol was discussed withthe patient, informed consent was obtained. -Treatment guidelines were discussed and established  -Risks and benefits of narcotics were addressedwith the patient  - Obtainable long term and short term goals of opioid therapy were reviewed, including pain relief, sleep, psychosocial and physical functioning   -CBT techniques- relaxation therapies such as biofeedback, mindfulness based stress reduction, imagery, cognitive restructuring, problem solving discussed with patient   -Obtain urine Toxicology today  -SOAPP score:5  -ORT:0  -PHQ-9:5  -Decrease gabapentin to 300 mg twice daily to decrease side effect profile and help relieve neuropathic pain  -Percocet 5 mg tabs 3 times daily as needed for pain  -Follow-up in 4 weeks for reassessment and urine drug screen review        Controlled Substances Monitoring:  [place] record was obtained and reviewed  for the last one year and no indicators of drug misuse  were found. Any other controlled substance prescriptions  seen on the record have been accounted for, I am aware of the patient receiving these medications. [place] record will be rechecked as part of office protocol. Thank you for your consult, Dr. Carisa Brewster. Please see my notes and plan of care.     MARCIANO Burns

## 2023-01-06 PROBLEM — M19.90 ARTHRITIS: Status: ACTIVE | Noted: 2017-08-11

## 2023-01-06 PROBLEM — M51.36 DDD (DEGENERATIVE DISC DISEASE), LUMBAR: Status: ACTIVE | Noted: 2018-03-29

## 2023-01-06 PROBLEM — Z51.81 ENCOUNTER FOR THERAPEUTIC DRUG MONITORING: Status: ACTIVE | Noted: 2023-01-06

## 2023-01-06 PROBLEM — G89.4 CHRONIC PAIN SYNDROME: Status: ACTIVE | Noted: 2023-01-06

## 2023-01-06 PROBLEM — M51.369 DDD (DEGENERATIVE DISC DISEASE), LUMBAR: Status: ACTIVE | Noted: 2018-03-29

## 2023-01-06 PROBLEM — M53.86 DECREASED RANGE OF MOTION OF LUMBAR SPINE: Status: ACTIVE | Noted: 2018-04-24

## 2023-01-06 PROBLEM — E11.42 DIABETIC POLYNEUROPATHY (HCC): Status: ACTIVE | Noted: 2020-05-28

## 2023-01-06 PROBLEM — M47.817 LUMBOSACRAL SPONDYLOSIS WITHOUT MYELOPATHY: Status: ACTIVE | Noted: 2018-03-29

## 2023-01-06 PROBLEM — M47.816 ARTHROPATHY OF LUMBAR FACET JOINT: Status: ACTIVE | Noted: 2019-06-10

## 2023-01-06 ASSESSMENT — ENCOUNTER SYMPTOMS
CONSTIPATION: 0
SHORTNESS OF BREATH: 0
BACK PAIN: 1
CHEST TIGHTNESS: 0

## 2023-01-24 ENCOUNTER — OFFICE VISIT (OUTPATIENT)
Dept: PAIN MANAGEMENT | Age: 63
End: 2023-01-24
Payer: MEDICARE

## 2023-01-24 VITALS
BODY MASS INDEX: 34.68 KG/M2 | WEIGHT: 221.4 LBS | OXYGEN SATURATION: 93 % | SYSTOLIC BLOOD PRESSURE: 123 MMHG | DIASTOLIC BLOOD PRESSURE: 75 MMHG | HEART RATE: 108 BPM

## 2023-01-24 DIAGNOSIS — M47.817 LUMBOSACRAL SPONDYLOSIS WITHOUT MYELOPATHY: ICD-10-CM

## 2023-01-24 DIAGNOSIS — M19.90 ARTHRITIS: ICD-10-CM

## 2023-01-24 DIAGNOSIS — M53.86 DECREASED RANGE OF MOTION OF LUMBAR SPINE: ICD-10-CM

## 2023-01-24 DIAGNOSIS — M47.816 ARTHROPATHY OF LUMBAR FACET JOINT: ICD-10-CM

## 2023-01-24 DIAGNOSIS — M47.816 LUMBAR SPONDYLOSIS: ICD-10-CM

## 2023-01-24 DIAGNOSIS — Z51.81 ENCOUNTER FOR THERAPEUTIC DRUG MONITORING: ICD-10-CM

## 2023-01-24 DIAGNOSIS — M51.36 DDD (DEGENERATIVE DISC DISEASE), LUMBAR: ICD-10-CM

## 2023-01-24 DIAGNOSIS — G89.4 CHRONIC PAIN SYNDROME: ICD-10-CM

## 2023-01-24 PROCEDURE — 1124F ACP DISCUSS-NO DSCNMKR DOCD: CPT | Performed by: NURSE PRACTITIONER

## 2023-01-24 PROCEDURE — 99214 OFFICE O/P EST MOD 30 MIN: CPT | Performed by: NURSE PRACTITIONER

## 2023-01-24 RX ORDER — NALOXONE HYDROCHLORIDE 4 MG/.1ML
1 SPRAY NASAL PRN
Qty: 2 EACH | Refills: 0 | Status: SHIPPED | OUTPATIENT
Start: 2023-01-24 | End: 2023-01-26

## 2023-01-24 RX ORDER — OXYCODONE HYDROCHLORIDE AND ACETAMINOPHEN 5; 325 MG/1; MG/1
1 TABLET ORAL EVERY 8 HOURS PRN
Qty: 84 TABLET | Refills: 0 | Status: SHIPPED | OUTPATIENT
Start: 2023-01-24 | End: 2023-02-21

## 2023-01-24 NOTE — PROGRESS NOTES
Wally Villalba  1960  [id number]      HISTORY OF PRESENT ILLNESS: Mr. Marlon Coffey is a 70 y.o. male returns for a follow up visit for pain management  He has a diagnosis of   1. Encounter for therapeutic drug monitoring    2. Lumbar spondylosis    3. Chronic pain syndrome    4. Arthritis    5. Arthropathy of lumbar facet joint    6. DDD (degenerative disc disease), lumbar    7. Decreased range of motion of lumbar spine    8. Lumbosacral spondylosis without myelopathy    . As per Information Obtained from the PADT (Patient Assessment and Documentation Tool)    He complains of pain in the lower back. He rates the pain 7/10 and describes it as aching. Current treatment regimen has helped relieve about 80% of the pain. He denies any side effects from the current pain regimen. Patient reports that since the last follow up visit the physical functioning is better, family/social relationships are unchanged, mood is unchanged sleep patterns are unchanged, and that the overall functioning is unchanged. Patient denies misusing/abusing his narcotic pain medications or using any illegal drugs. Upon obtaining medical history from Mr. Robison Henny: Patients list of allergies were reviewed     MEDICATIONS: Mr. Marlon Coffey list of medications were reviewed. His current medications are   Outpatient Medications Prior to Visit   Medication Sig Dispense Refill    atorvastatin (LIPITOR) 80 MG tablet       celecoxib (CELEBREX) 200 MG capsule       clotrimazole (LOTRIMIN) 1 % cream clotrimazole 1 % topical cream   APPLY TO THE AFFECTED AND SURROUNDING AREAS OF SKIN ON FEET TWICE DAILY IN THE MORNING AND EVENING      Continuous Blood Gluc Sensor (FREESTYLE BILLY 14 DAY SENSOR) MISC USE AS DIRECTED      dorzolamide (TRUSOPT) 2 % ophthalmic solution dorzolamide 2 % eye drops   INSTILL 1 DROP INTO EACH EYE TWICE DAILY      DULoxetine (CYMBALTA) 60 MG extended release capsule       fluticasone-salmeterol (ADVAIR) 250-50 MCG/ACT AEPB diskus inhaler       losartan (COZAAR) 100 MG tablet       propranolol (INDERAL LA) 60 MG extended release capsule propranolol ER 60 mg capsule,24 hr,extended release   TAKE 1 CAPSULE BY MOUTH AT BEDTIME      prednisoLONE acetate (PRED FORTE) 1 % ophthalmic suspension prednisolone acetate 1 % eye drops,suspension   AFTER SURGERY INSTILL 1 DROP INTO OPERATED EYE 4 TIMES DAILY FOR 2 WEEKS; THEN TWICE DAILY FOR 1 WEEK; THEN ONCE A DAY FOR 1 WEEK, THEN STOP      dulaglutide (TRULICITY) 1.5 BC/7.7RP SC injection 1.5 mg      gabapentin (NEURONTIN) 300 MG capsule Take 1 capsule by mouth 2 times daily for 30 days. 60 capsule 0    oxyCODONE-acetaminophen (PERCOCET) 5-325 MG per tablet Take 1 tablet by mouth every 8 hours as needed for Pain for up to 28 days. Max Daily Amount: 3 tablets 84 tablet 0    clotrimazole (LOTRIMIN) 1 % cream Apply topically 2 times daily Apply topically 2 times daily. DULoxetine (CYMBALTA) 20 MG extended release capsule Take 20 mg by mouth daily      dapagliflozin (FARXIGA) 10 MG tablet Take 10 mg by mouth every morning      FLUTICASONE FUROATE IN Inhale 250 mcg into the lungs      gabapentin (NEURONTIN) 300 MG capsule Take 300 mg by mouth 3 times daily.       insulin glargine (LANTUS) 100 UNIT/ML injection vial Inject into the skin nightly      losartan (COZAAR) 50 MG tablet Take 50 mg by mouth 2 times daily      pantoprazole (PROTONIX) 20 MG tablet Take 20 mg by mouth daily      loratadine (CLARITIN) 10 MG tablet Take 1 tablet by mouth daily 15 tablet 0    Dulaglutide 0.75 MG/0.5ML SOPN Inject 0.75 mg into the skin      aspirin EC 81 MG EC tablet Take 1 tablet by mouth daily 30 tablet 11    albuterol sulfate  (90 BASE) MCG/ACT inhaler Inhale 2 puffs into the lungs every 6 hours as needed for Wheezing 1 Inhaler 2    metFORMIN (GLUCOPHAGE) 500 MG tablet Take 1 tablet by mouth 2 times daily (with meals) (Patient taking differently: Take 500 mg by mouth 3 times daily (before meals)) 180 tablet 0    atorvastatin (LIPITOR) 40 MG tablet Take 1 tablet by mouth daily (Patient taking differently: Take 80 mg by mouth daily) 30 tablet 2    Glucose Blood (BLOOD GLUCOSE TEST STRIPS) STRP Use twice daily 60 strip 11    Lancets MISC Use twice per day 60 each 11     No facility-administered medications prior to visit. SOCIAL/FAMILY/PAST MEDICAL HISTORY: Mr. Adi Stacy, family and past medical history was reviewed. REVIEW OF SYSTEMS:    Respiratory: Negative for apnea, chest tightness and shortness of breath or change in baseline breathing. Gastrointestinal: Negative for nausea, vomiting, abdominal pain, diarrhea, constipation, blood in stool and abdominal distention. PHYSICAL EXAM:   Nursing note and vitals reviewed. There were no vitals taken for this visit. Constitutional: He appears well-developed and well-nourished. No acute distress. Skin: Skin is warm and dry, good turgor. No rash noted. He is not diaphoretic. Cardiovascular: Normal rate, regular rhythm, normal heart sounds, and does not have murmur. Pulmonary/Chest: Effort normal. No respiratory distress. He does not have wheezes in the lung fields. He has no rales. Neurological/Psychiatric:He is alert and oriented to person, place, and time. Coordination is  normal.  His mood isAppropriate and affect is Neutral/Euthymic(normal) . Prescription pain medication monitoring:                  MEDD current = 22.5              ORT Score = 0              Other Risk factors - stable mood              Date of Last Medication Agreement: 12/28/2022              Date Naloxone prescribed: 01/24/2023              UDT:                          Date of last UDT: 12/28/2022                          Adverse report: No              OARRS:                          Checked today: Yes                          Adverse report: No    IMPRESSION:   1. Encounter for therapeutic drug monitoring    2. Lumbar spondylosis    3.  Chronic pain syndrome    4. Arthritis    5. Arthropathy of lumbar facet joint    6. DDD (degenerative disc disease), lumbar    7. Decreased range of motion of lumbar spine    8.  Lumbosacral spondylosis without myelopathy        PLAN:  Informed verbal consent was obtained:  -New patient urine drug screen reviewed and consistent today  -Patient reports diarrhea with some constipation recent ER visit related to blood in stool, he was told he has hemorrhoids and reports his bowel function is returned to normal for him  -He feels new pain regimen is helping 80% of his pain and denies side effect  -We will continue and refill Percocet 5 mg tabs 3 times a day as needed for pain  -Narcan per protocol  -Follow-up in 4 weeks for reassessment    Analgesic Plan:              Continue present regimen:yes              Adjust dose of present analgesic:no              Switch analgesics:no              Add/Adjust concomitant therapy:no      Current Outpatient Medications   Medication Sig Dispense Refill    atorvastatin (LIPITOR) 80 MG tablet       celecoxib (CELEBREX) 200 MG capsule       clotrimazole (LOTRIMIN) 1 % cream clotrimazole 1 % topical cream   APPLY TO THE AFFECTED AND SURROUNDING AREAS OF SKIN ON FEET TWICE DAILY IN THE MORNING AND EVENING      Continuous Blood Gluc Sensor (FREESTYLE BILLY 14 DAY SENSOR) MISC USE AS DIRECTED      dorzolamide (TRUSOPT) 2 % ophthalmic solution dorzolamide 2 % eye drops   INSTILL 1 DROP INTO EACH EYE TWICE DAILY      DULoxetine (CYMBALTA) 60 MG extended release capsule       fluticasone-salmeterol (ADVAIR) 250-50 MCG/ACT AEPB diskus inhaler       losartan (COZAAR) 100 MG tablet       propranolol (INDERAL LA) 60 MG extended release capsule propranolol ER 60 mg capsule,24 hr,extended release   TAKE 1 CAPSULE BY MOUTH AT BEDTIME      prednisoLONE acetate (PRED FORTE) 1 % ophthalmic suspension prednisolone acetate 1 % eye drops,suspension   AFTER SURGERY INSTILL 1 DROP INTO OPERATED EYE 4 TIMES DAILY FOR 2 WEEKS; THEN TWICE DAILY FOR 1 WEEK; THEN ONCE A DAY FOR 1 WEEK, THEN STOP      dulaglutide (TRULICITY) 1.5 JW/0.5MX SC injection 1.5 mg      gabapentin (NEURONTIN) 300 MG capsule Take 1 capsule by mouth 2 times daily for 30 days. 60 capsule 0    oxyCODONE-acetaminophen (PERCOCET) 5-325 MG per tablet Take 1 tablet by mouth every 8 hours as needed for Pain for up to 28 days. Max Daily Amount: 3 tablets 84 tablet 0    clotrimazole (LOTRIMIN) 1 % cream Apply topically 2 times daily Apply topically 2 times daily. DULoxetine (CYMBALTA) 20 MG extended release capsule Take 20 mg by mouth daily      dapagliflozin (FARXIGA) 10 MG tablet Take 10 mg by mouth every morning      FLUTICASONE FUROATE IN Inhale 250 mcg into the lungs      gabapentin (NEURONTIN) 300 MG capsule Take 300 mg by mouth 3 times daily. insulin glargine (LANTUS) 100 UNIT/ML injection vial Inject into the skin nightly      losartan (COZAAR) 50 MG tablet Take 50 mg by mouth 2 times daily      pantoprazole (PROTONIX) 20 MG tablet Take 20 mg by mouth daily      loratadine (CLARITIN) 10 MG tablet Take 1 tablet by mouth daily 15 tablet 0    Dulaglutide 0.75 MG/0.5ML SOPN Inject 0.75 mg into the skin      aspirin EC 81 MG EC tablet Take 1 tablet by mouth daily 30 tablet 11    albuterol sulfate  (90 BASE) MCG/ACT inhaler Inhale 2 puffs into the lungs every 6 hours as needed for Wheezing 1 Inhaler 2    metFORMIN (GLUCOPHAGE) 500 MG tablet Take 1 tablet by mouth 2 times daily (with meals) (Patient taking differently: Take 500 mg by mouth 3 times daily (before meals)) 180 tablet 0    atorvastatin (LIPITOR) 40 MG tablet Take 1 tablet by mouth daily (Patient taking differently: Take 80 mg by mouth daily) 30 tablet 2    Glucose Blood (BLOOD GLUCOSE TEST STRIPS) STRP Use twice daily 60 strip 11    Lancets MISC Use twice per day 60 each 11     No current facility-administered medications for this visit.      I will continue his current medication regimen which is part of the above treatment schedule. It has been helping with Mr. Angeles Jaquez chronic  medical problems which for this visit include:   Diagnoses of Encounter for therapeutic drug monitoring, Lumbar spondylosis, Chronic pain syndrome, Arthritis, Arthropathy of lumbar facet joint, DDD (degenerative disc disease), lumbar, Decreased range of motion of lumbar spine, and Lumbosacral spondylosis without myelopathy were pertinent to this visit. Risks and benefits of the medications and other alternative treatments  including no treatment were discussed with the patient. The common side effects of these medications were also explained to the patient. Informed verbal consent was obtained. Goals of current treatment regimen include improvement in pain, restoration of functioning- with focus on improvement in physical performance, general activity, work or disability,emotional distress, health care utilization and  decreased medication consumption. Will continue to monitor progress towards achieving/maintaining therapeutic goals with special emphasis on  1. Improvement in perceived interfernce  of pain with ADL's. Ability to do home exercises independently. Ability to do household chores indoor and/or outdoor work and social and leisure activities. Improve psychosocial and physical functioning. - he is showing progression towards this treatment goal with the current regimen. He was advised against drinking alcohol with the narcotic pain medicines, advised against driving or handling machinery while adjusting the dose of medicines or if having cognitive  issues related to the current medications. Risk of overdose and death, if medicines not taken as prescribed, were also discussed. If the patient develops new symptoms or if the symptoms worsen, the patient should call the office.     While transcribing every attempt was made to maintain the accuracy of the note in terms of it's contents,there may have been some errors made inadvertently. Thank you for allowing me to participate in the care of this patient.     MARCIANO Burns    Cc: David Munoz MD

## 2023-02-21 ENCOUNTER — OFFICE VISIT (OUTPATIENT)
Dept: PAIN MANAGEMENT | Age: 63
End: 2023-02-21
Payer: MEDICARE

## 2023-02-21 VITALS
DIASTOLIC BLOOD PRESSURE: 74 MMHG | BODY MASS INDEX: 34.8 KG/M2 | OXYGEN SATURATION: 93 % | WEIGHT: 222.2 LBS | SYSTOLIC BLOOD PRESSURE: 114 MMHG | HEART RATE: 91 BPM

## 2023-02-21 DIAGNOSIS — M19.90 ARTHRITIS: ICD-10-CM

## 2023-02-21 DIAGNOSIS — G89.4 CHRONIC PAIN SYNDROME: ICD-10-CM

## 2023-02-21 DIAGNOSIS — M47.816 ARTHROPATHY OF LUMBAR FACET JOINT: ICD-10-CM

## 2023-02-21 DIAGNOSIS — M47.817 LUMBOSACRAL SPONDYLOSIS WITHOUT MYELOPATHY: Primary | ICD-10-CM

## 2023-02-21 DIAGNOSIS — M51.36 DDD (DEGENERATIVE DISC DISEASE), LUMBAR: ICD-10-CM

## 2023-02-21 DIAGNOSIS — Z51.81 ENCOUNTER FOR THERAPEUTIC DRUG MONITORING: ICD-10-CM

## 2023-02-21 DIAGNOSIS — M53.86 DECREASED RANGE OF MOTION OF LUMBAR SPINE: ICD-10-CM

## 2023-02-21 DIAGNOSIS — M47.816 LUMBAR SPONDYLOSIS: ICD-10-CM

## 2023-02-21 PROCEDURE — 99213 OFFICE O/P EST LOW 20 MIN: CPT | Performed by: NURSE PRACTITIONER

## 2023-02-21 PROCEDURE — 1124F ACP DISCUSS-NO DSCNMKR DOCD: CPT | Performed by: NURSE PRACTITIONER

## 2023-02-21 RX ORDER — OXYCODONE HYDROCHLORIDE AND ACETAMINOPHEN 5; 325 MG/1; MG/1
1 TABLET ORAL EVERY 8 HOURS PRN
Qty: 84 TABLET | Refills: 0 | Status: SHIPPED | OUTPATIENT
Start: 2023-02-21 | End: 2023-03-21

## 2023-02-21 NOTE — PROGRESS NOTES
Destiny Dwyer  1960  [id number]      HISTORY OF PRESENT ILLNESS: Mr. Lula Sheppard is a 70 y.o. male returns for a follow up visit for pain management  He has a diagnosis of   1. Lumbosacral spondylosis without myelopathy    2. Encounter for therapeutic drug monitoring    3. Lumbar spondylosis    4. Chronic pain syndrome    5. Arthritis    6. Arthropathy of lumbar facet joint    7. DDD (degenerative disc disease), lumbar    8. Decreased range of motion of lumbar spine    . As per Information Obtained from the PADT (Patient Assessment and Documentation Tool)    He complains of pain in the mid back, lower back. He rates the pain 7/10 and describes it as aching. Current treatment regimen has helped relieve about 50% of the pain. He denies any side effects from the current pain regimen. Patient reports that since the last follow up visit the physical functioning is unchanged, family/social relationships are unchanged, mood is unchanged sleep patterns are unchanged, and that the overall functioning is unchanged. Patient denies misusing/abusing his narcotic pain medications or using any illegal drugs. Upon obtaining medical history from Mr. Evaristo Rodarte: Patients list of allergies were reviewed     MEDICATIONS: Mr. Lula Sheppard list of medications were reviewed. His current medications are   Outpatient Medications Prior to Visit   Medication Sig Dispense Refill    oxyCODONE-acetaminophen (PERCOCET) 5-325 MG per tablet Take 1 tablet by mouth every 8 hours as needed for Pain for up to 28 days.  Max Daily Amount: 3 tablets 84 tablet 0    atorvastatin (LIPITOR) 80 MG tablet       celecoxib (CELEBREX) 200 MG capsule       clotrimazole (LOTRIMIN) 1 % cream clotrimazole 1 % topical cream   APPLY TO THE AFFECTED AND SURROUNDING AREAS OF SKIN ON FEET TWICE DAILY IN THE MORNING AND EVENING      Continuous Blood Gluc Sensor (FREESTYLE BILLY 14 DAY SENSOR) MISC USE AS DIRECTED      dorzolamide (TRUSOPT) 2 % ophthalmic solution dorzolamide 2 % eye drops   INSTILL 1 DROP INTO EACH EYE TWICE DAILY      DULoxetine (CYMBALTA) 60 MG extended release capsule       fluticasone-salmeterol (ADVAIR) 250-50 MCG/ACT AEPB diskus inhaler       losartan (COZAAR) 100 MG tablet       propranolol (INDERAL LA) 60 MG extended release capsule propranolol ER 60 mg capsule,24 hr,extended release   TAKE 1 CAPSULE BY MOUTH AT BEDTIME      prednisoLONE acetate (PRED FORTE) 1 % ophthalmic suspension prednisolone acetate 1 % eye drops,suspension   AFTER SURGERY INSTILL 1 DROP INTO OPERATED EYE 4 TIMES DAILY FOR 2 WEEKS; THEN TWICE DAILY FOR 1 WEEK; THEN ONCE A DAY FOR 1 WEEK, THEN STOP      dulaglutide (TRULICITY) 1.5 LV/2.5EQ SC injection 1.5 mg      clotrimazole (LOTRIMIN) 1 % cream Apply topically 2 times daily Apply topically 2 times daily. DULoxetine (CYMBALTA) 20 MG extended release capsule Take 20 mg by mouth daily      dapagliflozin (FARXIGA) 10 MG tablet Take 10 mg by mouth every morning      FLUTICASONE FUROATE IN Inhale 250 mcg into the lungs      gabapentin (NEURONTIN) 300 MG capsule Take 300 mg by mouth 3 times daily.       insulin glargine (LANTUS) 100 UNIT/ML injection vial Inject into the skin nightly      losartan (COZAAR) 50 MG tablet Take 50 mg by mouth 2 times daily      pantoprazole (PROTONIX) 20 MG tablet Take 20 mg by mouth daily      loratadine (CLARITIN) 10 MG tablet Take 1 tablet by mouth daily 15 tablet 0    Dulaglutide 0.75 MG/0.5ML SOPN Inject 0.75 mg into the skin      aspirin EC 81 MG EC tablet Take 1 tablet by mouth daily 30 tablet 11    albuterol sulfate  (90 BASE) MCG/ACT inhaler Inhale 2 puffs into the lungs every 6 hours as needed for Wheezing 1 Inhaler 2    metFORMIN (GLUCOPHAGE) 500 MG tablet Take 1 tablet by mouth 2 times daily (with meals) (Patient taking differently: Take 500 mg by mouth 3 times daily (before meals)) 180 tablet 0    atorvastatin (LIPITOR) 40 MG tablet Take 1 tablet by mouth daily (Patient taking differently: Take 80 mg by mouth daily) 30 tablet 2    Glucose Blood (BLOOD GLUCOSE TEST STRIPS) STRP Use twice daily 60 strip 11    Lancets MISC Use twice per day 60 each 11    gabapentin (NEURONTIN) 300 MG capsule Take 1 capsule by mouth 2 times daily for 30 days. 60 capsule 0     No facility-administered medications prior to visit. SOCIAL/FAMILY/PAST MEDICAL HISTORY: Mr. Samy Landry, family and past medical history was reviewed. REVIEW OF SYSTEMS:    Respiratory: Negative for apnea, chest tightness and shortness of breath or change in baseline breathing. Gastrointestinal: Negative for nausea, vomiting, abdominal pain, diarrhea, constipation, blood in stool and abdominal distention. PHYSICAL EXAM:   Nursing note and vitals reviewed. /74   Pulse 91   Wt 222 lb 3.2 oz (100.8 kg)   SpO2 93%   BMI 34.80 kg/m²   Constitutional: He appears well-developed and well-nourished. No acute distress. Skin: Skin is warm and dry, good turgor. No rash noted. He is not diaphoretic. Cardiovascular: Normal rate, regular rhythm, normal heart sounds, and does not have murmur. Pulmonary/Chest: Effort normal. No respiratory distress. He does not have wheezes in the lung fields. He has no rales. Neurological/Psychiatric:He is alert and oriented to person, place, and time. Coordination is  normal.  His mood isAppropriate and affect is Neutral/Euthymic(normal) . Prescription pain medication monitoring:                  MEDD current = 22.5              ORT Score = 0              Other Risk factors - (mood) stable              Date of Last Medication Agreement: 12/28/2022              Date Naloxone prescribed: 01/24/2023              UDT:                          Date of last UDT: 12/28/2022                          Adverse report: No              OARRS:                          Checked today: Yes                          Adverse report: No    IMPRESSION:   1.  Lumbosacral spondylosis without myelopathy    2. Encounter for therapeutic drug monitoring    3. Lumbar spondylosis    4. Chronic pain syndrome    5. Arthritis    6. Arthropathy of lumbar facet joint    7. DDD (degenerative disc disease), lumbar    8. Decreased range of motion of lumbar spine        PLAN:  Informed verbal consent was obtained:  -cont and refill percocet 5mg tabs TID PRN pain   -f/u 4 weeks for reassessment       Analgesic Plan:              Continue present regimen:yes              Adjust dose of present analgesic:no              Switch analgesics:no              Add/Adjust concomitant therapy:no      Current Outpatient Medications   Medication Sig Dispense Refill    oxyCODONE-acetaminophen (PERCOCET) 5-325 MG per tablet Take 1 tablet by mouth every 8 hours as needed for Pain for up to 28 days.  Max Daily Amount: 3 tablets 84 tablet 0    atorvastatin (LIPITOR) 80 MG tablet       celecoxib (CELEBREX) 200 MG capsule       clotrimazole (LOTRIMIN) 1 % cream clotrimazole 1 % topical cream   APPLY TO THE AFFECTED AND SURROUNDING AREAS OF SKIN ON FEET TWICE DAILY IN THE MORNING AND EVENING      Continuous Blood Gluc Sensor (FREESTYLE BILLY 14 DAY SENSOR) MISC USE AS DIRECTED      dorzolamide (TRUSOPT) 2 % ophthalmic solution dorzolamide 2 % eye drops   INSTILL 1 DROP INTO EACH EYE TWICE DAILY      DULoxetine (CYMBALTA) 60 MG extended release capsule       fluticasone-salmeterol (ADVAIR) 250-50 MCG/ACT AEPB diskus inhaler       losartan (COZAAR) 100 MG tablet       propranolol (INDERAL LA) 60 MG extended release capsule propranolol ER 60 mg capsule,24 hr,extended release   TAKE 1 CAPSULE BY MOUTH AT BEDTIME      prednisoLONE acetate (PRED FORTE) 1 % ophthalmic suspension prednisolone acetate 1 % eye drops,suspension   AFTER SURGERY INSTILL 1 DROP INTO OPERATED EYE 4 TIMES DAILY FOR 2 WEEKS; THEN TWICE DAILY FOR 1 WEEK; THEN ONCE A DAY FOR 1 WEEK, THEN STOP      dulaglutide (TRULICITY) 1.5 YY/2.6DU SC injection 1.5 mg clotrimazole (LOTRIMIN) 1 % cream Apply topically 2 times daily Apply topically 2 times daily. DULoxetine (CYMBALTA) 20 MG extended release capsule Take 20 mg by mouth daily      dapagliflozin (FARXIGA) 10 MG tablet Take 10 mg by mouth every morning      FLUTICASONE FUROATE IN Inhale 250 mcg into the lungs      gabapentin (NEURONTIN) 300 MG capsule Take 300 mg by mouth 3 times daily. insulin glargine (LANTUS) 100 UNIT/ML injection vial Inject into the skin nightly      losartan (COZAAR) 50 MG tablet Take 50 mg by mouth 2 times daily      pantoprazole (PROTONIX) 20 MG tablet Take 20 mg by mouth daily      loratadine (CLARITIN) 10 MG tablet Take 1 tablet by mouth daily 15 tablet 0    Dulaglutide 0.75 MG/0.5ML SOPN Inject 0.75 mg into the skin      aspirin EC 81 MG EC tablet Take 1 tablet by mouth daily 30 tablet 11    albuterol sulfate  (90 BASE) MCG/ACT inhaler Inhale 2 puffs into the lungs every 6 hours as needed for Wheezing 1 Inhaler 2    metFORMIN (GLUCOPHAGE) 500 MG tablet Take 1 tablet by mouth 2 times daily (with meals) (Patient taking differently: Take 500 mg by mouth 3 times daily (before meals)) 180 tablet 0    atorvastatin (LIPITOR) 40 MG tablet Take 1 tablet by mouth daily (Patient taking differently: Take 80 mg by mouth daily) 30 tablet 2    Glucose Blood (BLOOD GLUCOSE TEST STRIPS) STRP Use twice daily 60 strip 11    Lancets MISC Use twice per day 60 each 11    gabapentin (NEURONTIN) 300 MG capsule Take 1 capsule by mouth 2 times daily for 30 days. 60 capsule 0     No current facility-administered medications for this visit. I will continue his current medication regimen  which is part of the above treatment schedule. It has been helping with Mr. Zuleima Nelson chronic  medical problems which for this visit include: The primary encounter diagnosis was Lumbosacral spondylosis without myelopathy.  Diagnoses of Encounter for therapeutic drug monitoring, Lumbar spondylosis, Chronic pain syndrome, Arthritis, Arthropathy of lumbar facet joint, DDD (degenerative disc disease), lumbar, and Decreased range of motion of lumbar spine were also pertinent to this visit. Risks and benefits of the medications and other alternative treatments  including no treatment were discussed with the patient. The common side effects of these medications were also explained to the patient. Informed verbal consent was obtained. Goals of current treatment regimen include improvement in pain, restoration of functioning- with focus on improvement in physical performance, general activity, work or disability,emotional distress, health care utilization and  decreased medication consumption. Will continue to monitor progress towards achieving/maintaining therapeutic goals with special emphasis on  1. Improvement in perceived interfernce  of pain with ADL's. Ability to do home exercises independently. Ability to do household chores indoor and/or outdoor work and social and leisure activities. Improve psychosocial and physical functioning. - he is maintaining his treatment goal with the current regimen. He was advised against drinking alcohol with the narcotic pain medicines, advised against driving or handling machinery while adjusting the dose of medicines or if having cognitive  issues related to the current medications. Risk of overdose and death, if medicines not taken as prescribed, were also discussed. If the patient develops new symptoms or if the symptoms worsen, the patient should call the office. While transcribing every attempt was made to maintain the accuracy of the note in terms of it's contents,there may have been some errors made inadvertently. Thank you for allowing me to participate in the care of this patient.     MARCIANO Bardales    Cc: Randy Morocho MD

## 2023-03-20 ENCOUNTER — TELEPHONE (OUTPATIENT)
Dept: PAIN MANAGEMENT | Age: 63
End: 2023-03-20

## 2023-03-21 ENCOUNTER — OFFICE VISIT (OUTPATIENT)
Dept: PAIN MANAGEMENT | Age: 63
End: 2023-03-21
Payer: MEDICARE

## 2023-03-21 VITALS
HEART RATE: 79 BPM | DIASTOLIC BLOOD PRESSURE: 100 MMHG | WEIGHT: 220.4 LBS | SYSTOLIC BLOOD PRESSURE: 142 MMHG | BODY MASS INDEX: 34.52 KG/M2 | OXYGEN SATURATION: 94 %

## 2023-03-21 DIAGNOSIS — M19.90 ARTHRITIS: ICD-10-CM

## 2023-03-21 DIAGNOSIS — M47.816 LUMBAR SPONDYLOSIS: ICD-10-CM

## 2023-03-21 DIAGNOSIS — Z51.81 ENCOUNTER FOR THERAPEUTIC DRUG MONITORING: ICD-10-CM

## 2023-03-21 DIAGNOSIS — M53.86 DECREASED RANGE OF MOTION OF LUMBAR SPINE: ICD-10-CM

## 2023-03-21 DIAGNOSIS — M47.817 LUMBOSACRAL SPONDYLOSIS WITHOUT MYELOPATHY: ICD-10-CM

## 2023-03-21 DIAGNOSIS — M51.36 DDD (DEGENERATIVE DISC DISEASE), LUMBAR: ICD-10-CM

## 2023-03-21 DIAGNOSIS — M47.816 ARTHROPATHY OF LUMBAR FACET JOINT: ICD-10-CM

## 2023-03-21 DIAGNOSIS — G89.4 CHRONIC PAIN SYNDROME: ICD-10-CM

## 2023-03-21 PROCEDURE — 1124F ACP DISCUSS-NO DSCNMKR DOCD: CPT | Performed by: NURSE PRACTITIONER

## 2023-03-21 PROCEDURE — 99213 OFFICE O/P EST LOW 20 MIN: CPT | Performed by: NURSE PRACTITIONER

## 2023-03-21 RX ORDER — OXYCODONE HYDROCHLORIDE AND ACETAMINOPHEN 5; 325 MG/1; MG/1
1 TABLET ORAL EVERY 8 HOURS PRN
Qty: 84 TABLET | Refills: 0 | Status: SHIPPED | OUTPATIENT
Start: 2023-03-21 | End: 2023-04-18

## 2023-03-21 RX ORDER — GABAPENTIN 300 MG/1
300 CAPSULE ORAL 2 TIMES DAILY
Qty: 60 CAPSULE | Refills: 0 | Status: SHIPPED | OUTPATIENT
Start: 2023-03-21 | End: 2023-04-20

## 2023-03-21 NOTE — PROGRESS NOTES
I will continue his current medication regimen  which is part of the above treatment schedule. It has been helping with Mr. Jenny Sanchez chronic  medical problems which for this visit include:   Diagnoses of Lumbosacral spondylosis without myelopathy, Encounter for therapeutic drug monitoring, Lumbar spondylosis, Chronic pain syndrome, Arthritis, Arthropathy of lumbar facet joint, DDD (degenerative disc disease), lumbar, and Decreased range of motion of lumbar spine were pertinent to this visit. Risks and benefits of the medications and other alternative treatments  including no treatment were discussed with the patient. The common side effects of these medications were also explained to the patient. Informed verbal consent was obtained. Goals of current treatment regimen include improvement in pain, restoration of functioning- with focus on improvement in physical performance, general activity, work or disability,emotional distress, health care utilization and  decreased medication consumption. Will continue to monitor progress towards achieving/maintaining therapeutic goals with special emphasis on  1. Improvement in perceived interfernce  of pain with ADL's. Ability to do home exercises independently. Ability to do household chores indoor and/or outdoor work and social and leisure activities. Improve psychosocial and physical functioning. - he is showing progression towards this treatment goal with the current regimen. He was advised against drinking alcohol with the narcotic pain medicines, advised against driving or handling machinery while adjusting the dose of medicines or if having cognitive  issues related to the current medications. Risk of overdose and death, if medicines not taken as prescribed, were also discussed. If the patient develops new symptoms or if the symptoms worsen, the patient should call the office.     While transcribing every attempt was made to maintain the accuracy of the note in

## 2023-04-18 ENCOUNTER — OFFICE VISIT (OUTPATIENT)
Dept: PAIN MANAGEMENT | Age: 63
End: 2023-04-18
Payer: MEDICARE

## 2023-04-18 VITALS
DIASTOLIC BLOOD PRESSURE: 90 MMHG | HEART RATE: 78 BPM | WEIGHT: 220.4 LBS | SYSTOLIC BLOOD PRESSURE: 135 MMHG | OXYGEN SATURATION: 93 % | BODY MASS INDEX: 34.52 KG/M2

## 2023-04-18 DIAGNOSIS — Z51.81 ENCOUNTER FOR THERAPEUTIC DRUG MONITORING: ICD-10-CM

## 2023-04-18 DIAGNOSIS — M47.816 LUMBAR SPONDYLOSIS: ICD-10-CM

## 2023-04-18 DIAGNOSIS — M47.817 LUMBOSACRAL SPONDYLOSIS WITHOUT MYELOPATHY: ICD-10-CM

## 2023-04-18 DIAGNOSIS — M53.86 DECREASED RANGE OF MOTION OF LUMBAR SPINE: ICD-10-CM

## 2023-04-18 DIAGNOSIS — M51.36 DDD (DEGENERATIVE DISC DISEASE), LUMBAR: ICD-10-CM

## 2023-04-18 DIAGNOSIS — G89.4 CHRONIC PAIN SYNDROME: ICD-10-CM

## 2023-04-18 DIAGNOSIS — M19.90 ARTHRITIS: ICD-10-CM

## 2023-04-18 DIAGNOSIS — M47.816 ARTHROPATHY OF LUMBAR FACET JOINT: ICD-10-CM

## 2023-04-18 PROCEDURE — 1124F ACP DISCUSS-NO DSCNMKR DOCD: CPT | Performed by: NURSE PRACTITIONER

## 2023-04-18 PROCEDURE — 99213 OFFICE O/P EST LOW 20 MIN: CPT | Performed by: NURSE PRACTITIONER

## 2023-04-18 RX ORDER — GABAPENTIN 300 MG/1
300 CAPSULE ORAL 2 TIMES DAILY
Qty: 60 CAPSULE | Refills: 0 | Status: SHIPPED | OUTPATIENT
Start: 2023-04-18 | End: 2023-05-18

## 2023-04-18 RX ORDER — OXYCODONE HYDROCHLORIDE AND ACETAMINOPHEN 5; 325 MG/1; MG/1
1 TABLET ORAL EVERY 6 HOURS PRN
Qty: 112 TABLET | Refills: 0 | Status: SHIPPED | OUTPATIENT
Start: 2023-04-18 | End: 2023-05-16

## 2023-05-10 ENCOUNTER — TELEPHONE (OUTPATIENT)
Dept: PAIN MANAGEMENT | Age: 63
End: 2023-05-10

## 2023-05-10 NOTE — TELEPHONE ENCOUNTER
I called patient back, explained previous notes. He states he doesn't know where [facility] is and doesn't comprehend too good. So I scheduled him on Wednesday 5/17/23 ([address] [place] [address] Drive).

## 2023-05-10 NOTE — TELEPHONE ENCOUNTER
Patient called requesting a sooner apt on the 18th because he wants to go on vacation     Please advise

## 2023-05-17 ENCOUNTER — OFFICE VISIT (OUTPATIENT)
Dept: PAIN MANAGEMENT | Age: 63
End: 2023-05-17
Payer: MEDICARE

## 2023-05-17 ENCOUNTER — TELEPHONE (OUTPATIENT)
Dept: PAIN MANAGEMENT | Age: 63
End: 2023-05-17

## 2023-05-17 VITALS
BODY MASS INDEX: 33.36 KG/M2 | HEART RATE: 90 BPM | WEIGHT: 213 LBS | SYSTOLIC BLOOD PRESSURE: 132 MMHG | DIASTOLIC BLOOD PRESSURE: 88 MMHG

## 2023-05-17 DIAGNOSIS — G89.4 CHRONIC PAIN SYNDROME: ICD-10-CM

## 2023-05-17 DIAGNOSIS — M47.817 LUMBOSACRAL SPONDYLOSIS WITHOUT MYELOPATHY: ICD-10-CM

## 2023-05-17 DIAGNOSIS — M51.36 DDD (DEGENERATIVE DISC DISEASE), LUMBAR: ICD-10-CM

## 2023-05-17 DIAGNOSIS — Z51.81 ENCOUNTER FOR THERAPEUTIC DRUG MONITORING: ICD-10-CM

## 2023-05-17 DIAGNOSIS — M47.816 LUMBAR SPONDYLOSIS: ICD-10-CM

## 2023-05-17 DIAGNOSIS — M19.90 ARTHRITIS: ICD-10-CM

## 2023-05-17 DIAGNOSIS — M53.86 DECREASED RANGE OF MOTION OF LUMBAR SPINE: ICD-10-CM

## 2023-05-17 DIAGNOSIS — M47.816 ARTHROPATHY OF LUMBAR FACET JOINT: ICD-10-CM

## 2023-05-17 PROCEDURE — 1124F ACP DISCUSS-NO DSCNMKR DOCD: CPT | Performed by: NURSE PRACTITIONER

## 2023-05-17 PROCEDURE — 99214 OFFICE O/P EST MOD 30 MIN: CPT | Performed by: NURSE PRACTITIONER

## 2023-05-17 RX ORDER — GABAPENTIN 600 MG/1
600 TABLET ORAL 3 TIMES DAILY
Qty: 90 TABLET | Refills: 0 | Status: SHIPPED | OUTPATIENT
Start: 2023-05-17 | End: 2023-06-16

## 2023-05-17 RX ORDER — BUPRENORPHINE 10 UG/H
1 PATCH TRANSDERMAL WEEKLY
Qty: 4 PATCH | Refills: 0 | Status: SHIPPED | OUTPATIENT
Start: 2023-05-17 | End: 2023-06-14

## 2023-05-17 RX ORDER — CELECOXIB 100 MG/1
100 CAPSULE ORAL 2 TIMES DAILY
Qty: 60 CAPSULE | Refills: 0 | Status: SHIPPED | OUTPATIENT
Start: 2023-05-17 | End: 2023-06-16

## 2023-05-17 NOTE — PROGRESS NOTES
Juan C Larson  1960  [id number]      HISTORY OF PRESENT ILLNESS: Mr. Julius Tobar is a 70 y.o. male returns for a follow up visit for pain management  He has a diagnosis of   1. Lumbosacral spondylosis without myelopathy    2. Encounter for therapeutic drug monitoring    3. Lumbar spondylosis    4. Chronic pain syndrome    5. Arthritis    6. Arthropathy of lumbar facet joint    7. DDD (degenerative disc disease), lumbar    8. Decreased range of motion of lumbar spine    . As per Information Obtained from the PADT (Patient Assessment and Documentation Tool)    He complains of pain in the lower back. He rates the pain 7.5 and describes it as sharp, aching. Current treatment regimen has helped relieve about 50% of the pain. He denies any side effects from the current pain regimen. Patient reports that since the last follow up visit the physical functioning is unchanged, family/social relationships are unchanged, mood is unchanged sleep patterns are better, and that the overall functioning is unchanged. Patient denies misusing/abusing his narcotic pain medications or using any illegal drugs. Upon obtaining medical history from Mr. Yaneth Grossman: Patients list of allergies were reviewed     MEDICATIONS: Mr. Julius Tobar list of medications were reviewed. His current medications are   Outpatient Medications Prior to Visit   Medication Sig Dispense Refill    gabapentin (NEURONTIN) 300 MG capsule Take 1 capsule by mouth 2 times daily for 30 days.  60 capsule 0    atorvastatin (LIPITOR) 80 MG tablet       celecoxib (CELEBREX) 200 MG capsule       clotrimazole (LOTRIMIN) 1 % cream clotrimazole 1 % topical cream   APPLY TO THE AFFECTED AND SURROUNDING AREAS OF SKIN ON FEET TWICE DAILY IN THE MORNING AND EVENING      Continuous Blood Gluc Sensor (FREESTYLE BILLY 14 DAY SENSOR) MISC USE AS DIRECTED      dorzolamide (TRUSOPT) 2 % ophthalmic solution dorzolamide 2 % eye drops   INSTILL 1 DROP INTO EACH EYE TWICE DAILY

## 2023-05-17 NOTE — TELEPHONE ENCOUNTER
Submitted PA for BUPRENORPHINE PATCHU  [facility]ia [facility] Key: [id number]  STATUS: PENDING. Follow up done daily; if no response in three days we will refax for status check. If another three days goes by with no response we will call the insurance for status.

## 2023-06-21 ENCOUNTER — OFFICE VISIT (OUTPATIENT)
Dept: PAIN MANAGEMENT | Age: 63
End: 2023-06-21
Payer: MEDICARE

## 2023-06-21 VITALS
HEART RATE: 81 BPM | BODY MASS INDEX: 33.39 KG/M2 | DIASTOLIC BLOOD PRESSURE: 77 MMHG | OXYGEN SATURATION: 95 % | SYSTOLIC BLOOD PRESSURE: 123 MMHG | WEIGHT: 213.2 LBS

## 2023-06-21 DIAGNOSIS — M47.816 ARTHROPATHY OF LUMBAR FACET JOINT: ICD-10-CM

## 2023-06-21 DIAGNOSIS — Z51.81 ENCOUNTER FOR THERAPEUTIC DRUG MONITORING: ICD-10-CM

## 2023-06-21 DIAGNOSIS — M53.86 DECREASED RANGE OF MOTION OF LUMBAR SPINE: ICD-10-CM

## 2023-06-21 DIAGNOSIS — M51.36 DDD (DEGENERATIVE DISC DISEASE), LUMBAR: ICD-10-CM

## 2023-06-21 DIAGNOSIS — M47.816 LUMBAR SPONDYLOSIS: ICD-10-CM

## 2023-06-21 DIAGNOSIS — M47.817 LUMBOSACRAL SPONDYLOSIS WITHOUT MYELOPATHY: ICD-10-CM

## 2023-06-21 DIAGNOSIS — M19.90 ARTHRITIS: ICD-10-CM

## 2023-06-21 DIAGNOSIS — G89.4 CHRONIC PAIN SYNDROME: Primary | ICD-10-CM

## 2023-06-21 PROCEDURE — 1124F ACP DISCUSS-NO DSCNMKR DOCD: CPT | Performed by: NURSE PRACTITIONER

## 2023-06-21 PROCEDURE — 99213 OFFICE O/P EST LOW 20 MIN: CPT | Performed by: NURSE PRACTITIONER

## 2023-06-21 RX ORDER — BUPRENORPHINE 10 UG/H
1 PATCH TRANSDERMAL WEEKLY
Qty: 4 PATCH | Refills: 0 | Status: SHIPPED | OUTPATIENT
Start: 2023-06-21 | End: 2023-06-22 | Stop reason: SDUPTHER

## 2023-06-21 RX ORDER — GABAPENTIN 600 MG/1
600 TABLET ORAL 3 TIMES DAILY
Qty: 90 TABLET | Refills: 0 | Status: SHIPPED | OUTPATIENT
Start: 2023-06-21 | End: 2023-07-21

## 2023-06-21 RX ORDER — CELECOXIB 100 MG/1
100 CAPSULE ORAL 2 TIMES DAILY
Qty: 60 CAPSULE | Refills: 0 | Status: SHIPPED | OUTPATIENT
Start: 2023-06-21 | End: 2023-07-21

## 2023-06-21 NOTE — PROGRESS NOTES
errors made inadvertently. Thank you for allowing me to participate in the care of this patient.     MARCIANO Franco    Cc: Claudean Grove, MD

## 2023-06-22 ENCOUNTER — TELEPHONE (OUTPATIENT)
Dept: PAIN MANAGEMENT | Age: 63
End: 2023-06-22

## 2023-06-22 DIAGNOSIS — M53.86 DECREASED RANGE OF MOTION OF LUMBAR SPINE: ICD-10-CM

## 2023-06-22 DIAGNOSIS — M47.817 LUMBOSACRAL SPONDYLOSIS WITHOUT MYELOPATHY: ICD-10-CM

## 2023-06-22 DIAGNOSIS — M19.90 ARTHRITIS: ICD-10-CM

## 2023-06-22 DIAGNOSIS — M47.816 ARTHROPATHY OF LUMBAR FACET JOINT: ICD-10-CM

## 2023-06-22 DIAGNOSIS — G89.4 CHRONIC PAIN SYNDROME: ICD-10-CM

## 2023-06-22 DIAGNOSIS — M47.816 LUMBAR SPONDYLOSIS: ICD-10-CM

## 2023-06-22 DIAGNOSIS — Z51.81 ENCOUNTER FOR THERAPEUTIC DRUG MONITORING: ICD-10-CM

## 2023-06-22 DIAGNOSIS — M51.36 DDD (DEGENERATIVE DISC DISEASE), LUMBAR: ICD-10-CM

## 2023-06-22 RX ORDER — BUPRENORPHINE 10 UG/H
1 PATCH TRANSDERMAL WEEKLY
Qty: 4 PATCH | Refills: 0 | Status: SHIPPED | OUTPATIENT
Start: 2023-06-22 | End: 2023-07-20

## 2023-06-22 NOTE — TELEPHONE ENCOUNTER
Patient called and said his preferred pharmacy doesn't have the butrans in stock.  He would like [facility] to [facility] in [address] [place] [phone] - [address], [facility]        Please advise

## 2023-07-26 ENCOUNTER — OFFICE VISIT (OUTPATIENT)
Dept: PAIN MANAGEMENT | Age: 63
End: 2023-07-26
Payer: MEDICARE

## 2023-07-26 VITALS
WEIGHT: 212.2 LBS | OXYGEN SATURATION: 89 % | HEART RATE: 76 BPM | DIASTOLIC BLOOD PRESSURE: 79 MMHG | SYSTOLIC BLOOD PRESSURE: 123 MMHG | BODY MASS INDEX: 33.24 KG/M2

## 2023-07-26 DIAGNOSIS — M47.817 LUMBOSACRAL SPONDYLOSIS WITHOUT MYELOPATHY: ICD-10-CM

## 2023-07-26 DIAGNOSIS — M51.36 DDD (DEGENERATIVE DISC DISEASE), LUMBAR: ICD-10-CM

## 2023-07-26 DIAGNOSIS — Z51.81 ENCOUNTER FOR THERAPEUTIC DRUG MONITORING: ICD-10-CM

## 2023-07-26 DIAGNOSIS — M47.816 LUMBAR SPONDYLOSIS: ICD-10-CM

## 2023-07-26 DIAGNOSIS — M53.86 DECREASED RANGE OF MOTION OF LUMBAR SPINE: ICD-10-CM

## 2023-07-26 DIAGNOSIS — M19.90 ARTHRITIS: ICD-10-CM

## 2023-07-26 DIAGNOSIS — M47.816 ARTHROPATHY OF LUMBAR FACET JOINT: ICD-10-CM

## 2023-07-26 DIAGNOSIS — G89.4 CHRONIC PAIN SYNDROME: Primary | ICD-10-CM

## 2023-07-26 PROCEDURE — 99213 OFFICE O/P EST LOW 20 MIN: CPT | Performed by: NURSE PRACTITIONER

## 2023-07-26 PROCEDURE — 1124F ACP DISCUSS-NO DSCNMKR DOCD: CPT | Performed by: NURSE PRACTITIONER

## 2023-07-26 RX ORDER — GABAPENTIN 800 MG/1
800 TABLET ORAL 3 TIMES DAILY
Qty: 90 TABLET | Refills: 2 | Status: SHIPPED | OUTPATIENT
Start: 2023-07-26 | End: 2023-10-24

## 2023-07-26 RX ORDER — BUPRENORPHINE 15 UG/H
1 PATCH TRANSDERMAL WEEKLY
Qty: 4 PATCH | Refills: 0 | Status: SHIPPED | OUTPATIENT
Start: 2023-07-26 | End: 2023-08-23

## 2023-07-26 NOTE — PROGRESS NOTES
Clement Conroy  1960  [id number]      HISTORY OF PRESENT ILLNESS: Mr. Laura Maciel is a 70 y.o. male returns for a follow up visit for pain management  He has a diagnosis of   1. Chronic pain syndrome    2. Lumbosacral spondylosis without myelopathy    3. Encounter for therapeutic drug monitoring    4. Lumbar spondylosis    5. Arthritis    6. Arthropathy of lumbar facet joint    7. DDD (degenerative disc disease), lumbar    8. Decreased range of motion of lumbar spine    . As per Information Obtained from the PADT (Patient Assessment and Documentation Tool)    He complains of pain in the lower back. He rates the pain 8/10 and describes it as aching. Current treatment regimen has helped relieve about 80% of the pain. He denies any side effects from the current pain regimen. Patient reports that since the last follow up visit the physical functioning is unchanged, family/social relationships are unchanged, mood is unchanged sleep patterns are unchanged, and that the overall functioning is unchanged. Patient denies misusing/abusing his narcotic pain medications or using any illegal drugs. Upon obtaining medical history from Mr. Marquise Martínez: Patients list of allergies were reviewed     MEDICATIONS: Mr. Laura Maciel list of medications were reviewed. His current medications are   Outpatient Medications Prior to Visit   Medication Sig Dispense Refill    celecoxib (CELEBREX) 100 MG capsule Take 1 capsule by mouth 2 times daily 60 capsule 0    atorvastatin (LIPITOR) 80 MG tablet       clotrimazole (LOTRIMIN) 1 % cream clotrimazole 1 % topical cream   APPLY TO THE AFFECTED AND SURROUNDING AREAS OF SKIN ON FEET TWICE DAILY IN THE MORNING AND EVENING      Continuous Blood Gluc Sensor (Gravity JackSTYLE BILLY 14 DAY SENSOR) MISC USE AS DIRECTED      dorzolamide (TRUSOPT) 2 % ophthalmic solution dorzolamide 2 % eye drops   INSTILL 1 DROP INTO EACH EYE TWICE DAILY      DULoxetine (CYMBALTA) 60 MG extended release capsule

## 2023-09-13 ENCOUNTER — OFFICE VISIT (OUTPATIENT)
Dept: PAIN MANAGEMENT | Age: 63
End: 2023-09-13
Payer: MEDICARE

## 2023-09-13 VITALS
DIASTOLIC BLOOD PRESSURE: 85 MMHG | HEART RATE: 94 BPM | BODY MASS INDEX: 33.11 KG/M2 | SYSTOLIC BLOOD PRESSURE: 120 MMHG | OXYGEN SATURATION: 94 % | WEIGHT: 211.4 LBS

## 2023-09-13 DIAGNOSIS — M47.817 LUMBOSACRAL SPONDYLOSIS WITHOUT MYELOPATHY: ICD-10-CM

## 2023-09-13 DIAGNOSIS — M53.86 DECREASED RANGE OF MOTION OF LUMBAR SPINE: ICD-10-CM

## 2023-09-13 DIAGNOSIS — M47.816 LUMBAR SPONDYLOSIS: ICD-10-CM

## 2023-09-13 DIAGNOSIS — M19.90 ARTHRITIS: ICD-10-CM

## 2023-09-13 DIAGNOSIS — Z51.81 ENCOUNTER FOR THERAPEUTIC DRUG MONITORING: ICD-10-CM

## 2023-09-13 DIAGNOSIS — M51.36 DDD (DEGENERATIVE DISC DISEASE), LUMBAR: ICD-10-CM

## 2023-09-13 DIAGNOSIS — M47.816 ARTHROPATHY OF LUMBAR FACET JOINT: ICD-10-CM

## 2023-09-13 DIAGNOSIS — G89.4 CHRONIC PAIN SYNDROME: ICD-10-CM

## 2023-09-13 PROCEDURE — 1124F ACP DISCUSS-NO DSCNMKR DOCD: CPT | Performed by: NURSE PRACTITIONER

## 2023-09-13 PROCEDURE — 99213 OFFICE O/P EST LOW 20 MIN: CPT | Performed by: NURSE PRACTITIONER

## 2023-09-13 RX ORDER — BUPRENORPHINE 15 UG/H
1 PATCH TRANSDERMAL WEEKLY
Qty: 4 PATCH | Refills: 0 | Status: SHIPPED | OUTPATIENT
Start: 2023-09-13 | End: 2023-10-11

## 2023-09-13 RX ORDER — GABAPENTIN 800 MG/1
800 TABLET ORAL 3 TIMES DAILY
Qty: 90 TABLET | Refills: 2 | Status: SHIPPED | OUTPATIENT
Start: 2023-09-13 | End: 2023-12-12

## 2023-09-13 NOTE — PROGRESS NOTES
chronic  medical problems which for this visit include:   Diagnoses of Lumbosacral spondylosis without myelopathy, Encounter for therapeutic drug monitoring, Lumbar spondylosis, Chronic pain syndrome, Arthritis, Arthropathy of lumbar facet joint, DDD (degenerative disc disease), lumbar, and Decreased range of motion of lumbar spine were pertinent to this visit. Risks and benefits of the medications and other alternative treatments  including no treatment were discussed with the patient. The common side effects of these medications were also explained to the patient. Informed verbal consent was obtained. Goals of current treatment regimen include improvement in pain, restoration of functioning- with focus on improvement in physical performance, general activity, work or disability,emotional distress, health care utilization and  decreased medication consumption. Will continue to monitor progress towards achieving/maintaining therapeutic goals with special emphasis on  1. Improvement in perceived interfernce  of pain with ADL's. Ability to do home exercises independently. Ability to do household chores indoor and/or outdoor work and social and leisure activities. Improve psychosocial and physical functioning. he is showing progression towards this treatment goal with the current regimen. He was advised against drinking alcohol with the narcotic pain medicines, advised against driving or handling machinery while adjusting the dose of medicines or if having cognitive  issues related to the current medications. Risk of overdose and death, if medicines not taken as prescribed, were also discussed. If the patient develops new symptoms or if the symptoms worsen, the patient should call the office. While transcribing every attempt was made to maintain the accuracy of the note in terms of it's contents,there may have been some errors made inadvertently.   Thank you for allowing me to participate in the care of this

## 2023-12-28 RX ORDER — GABAPENTIN 800 MG/1
800 TABLET ORAL 3 TIMES DAILY
Qty: 90 TABLET | Refills: 2 | OUTPATIENT
Start: 2023-12-28

## 2024-01-11 RX ORDER — GABAPENTIN 800 MG/1
800 TABLET ORAL 3 TIMES DAILY
Qty: 90 TABLET | Refills: 0 | OUTPATIENT
Start: 2024-01-11

## 2024-01-22 ENCOUNTER — TELEPHONE (OUTPATIENT)
Dept: PAIN MANAGEMENT | Age: 64
End: 2024-01-22

## 2024-01-22 NOTE — TELEPHONE ENCOUNTER
Patient calling stating he has an 8:20 appointment, he states he needs a later appointment tomorrow due too bad weather (freezing rain). Patient states he would like to come in tomorrow afternoon or on Wednesday. He states he lives in [place], and can't drive in that. Patient would like a call back to see if it can be changed. I did not see anything any later.

## 2024-01-23 ENCOUNTER — OFFICE VISIT (OUTPATIENT)
Dept: PAIN MANAGEMENT | Age: 64
End: 2024-01-23
Payer: COMMERCIAL

## 2024-01-23 VITALS
HEART RATE: 103 BPM | BODY MASS INDEX: 33.83 KG/M2 | WEIGHT: 216 LBS | DIASTOLIC BLOOD PRESSURE: 74 MMHG | OXYGEN SATURATION: 96 % | SYSTOLIC BLOOD PRESSURE: 120 MMHG

## 2024-01-23 DIAGNOSIS — Z51.81 ENCOUNTER FOR THERAPEUTIC DRUG MONITORING: ICD-10-CM

## 2024-01-23 DIAGNOSIS — M47.816 ARTHROPATHY OF LUMBAR FACET JOINT: ICD-10-CM

## 2024-01-23 DIAGNOSIS — M47.817 LUMBOSACRAL SPONDYLOSIS WITHOUT MYELOPATHY: Primary | ICD-10-CM

## 2024-01-23 DIAGNOSIS — M51.36 DDD (DEGENERATIVE DISC DISEASE), LUMBAR: ICD-10-CM

## 2024-01-23 DIAGNOSIS — M53.86 DECREASED RANGE OF MOTION OF LUMBAR SPINE: ICD-10-CM

## 2024-01-23 DIAGNOSIS — M19.90 ARTHRITIS: ICD-10-CM

## 2024-01-23 DIAGNOSIS — M47.816 LUMBAR SPONDYLOSIS: ICD-10-CM

## 2024-01-23 DIAGNOSIS — G89.4 CHRONIC PAIN SYNDROME: ICD-10-CM

## 2024-01-23 PROCEDURE — 1124F ACP DISCUSS-NO DSCNMKR DOCD: CPT | Performed by: NURSE PRACTITIONER

## 2024-01-23 PROCEDURE — 99213 OFFICE O/P EST LOW 20 MIN: CPT | Performed by: NURSE PRACTITIONER

## 2024-01-23 RX ORDER — GABAPENTIN 800 MG/1
800 TABLET ORAL 3 TIMES DAILY
Qty: 90 TABLET | Refills: 2 | Status: SHIPPED | OUTPATIENT
Start: 2024-01-23 | End: 2024-04-22

## 2024-01-23 RX ORDER — PRIMIDONE 50 MG/1
25 TABLET ORAL NIGHTLY
Qty: 30 TABLET | Refills: 0 | Status: SHIPPED | OUTPATIENT
Start: 2024-01-23 | End: 2024-03-23

## 2024-01-23 NOTE — PROGRESS NOTES
injection 0.5 mLs      clotrimazole (LOTRIMIN) 1 % cream Apply topically 2 times daily Apply topically 2 times daily.      DULoxetine (CYMBALTA) 20 MG extended release capsule Take 1 capsule by mouth daily      dapagliflozin (FARXIGA) 10 MG tablet Take 1 tablet by mouth every morning      FLUTICASONE FUROATE IN Inhale 250 mcg into the lungs      insulin glargine (LANTUS) 100 UNIT/ML injection vial Inject into the skin nightly      losartan (COZAAR) 50 MG tablet Take 1 tablet by mouth 2 times daily      pantoprazole (PROTONIX) 20 MG tablet Take 1 tablet by mouth daily      loratadine (CLARITIN) 10 MG tablet Take 1 tablet by mouth daily 15 tablet 0    Dulaglutide 0.75 MG/0.5ML SOPN Inject 0.75 mg into the skin      aspirin EC 81 MG EC tablet Take 1 tablet by mouth daily 30 tablet 11    albuterol sulfate  (90 BASE) MCG/ACT inhaler Inhale 2 puffs into the lungs every 6 hours as needed for Wheezing 1 Inhaler 2    metFORMIN (GLUCOPHAGE) 500 MG tablet Take 1 tablet by mouth 2 times daily (with meals) (Patient taking differently: Take 1 tablet by mouth 3 times daily (before meals)) 180 tablet 0    atorvastatin (LIPITOR) 40 MG tablet Take 1 tablet by mouth daily (Patient taking differently: Take 2 tablets by mouth daily) 30 tablet 2    Glucose Blood (BLOOD GLUCOSE TEST STRIPS) STRP Use twice daily 60 strip 11    Lancets MISC Use twice per day 60 each 11     No current facility-administered medications for this visit.     I will continue his current medication regimen  which is part of the above treatment schedule. It has been helping with Mr. Epperson's chronic  medical problems which for this visit include:   The primary encounter diagnosis was Lumbosacral spondylosis without myelopathy. Diagnoses of Encounter for therapeutic drug monitoring, Lumbar spondylosis, Chronic pain syndrome, Arthritis, Arthropathy of lumbar facet joint, DDD (degenerative disc disease), lumbar, and Decreased range of motion of lumbar spine

## 2024-04-19 ENCOUNTER — TELEPHONE (OUTPATIENT)
Dept: PAIN MANAGEMENT | Age: 64
End: 2024-04-19

## 2024-04-19 NOTE — TELEPHONE ENCOUNTER
Reason for refill request: pt states he will be out prior to FU      Why is patient out of medication?: pt states the pharmacy don't have the refills      Name of medication: Gabapentin      [place] [facility] [place]      Phone number: [phone]      Last refill: 1/23/24      Next appointment: 4/23/24    Patient confirmed the above pharmacy has their medication in stock

## 2024-04-22 RX ORDER — GABAPENTIN 800 MG/1
800 TABLET ORAL 3 TIMES DAILY
Qty: 21 TABLET | Refills: 0 | Status: SHIPPED | OUTPATIENT
Start: 2024-04-22 | End: 2024-04-23 | Stop reason: SDUPTHER

## 2024-04-23 ENCOUNTER — OFFICE VISIT (OUTPATIENT)
Dept: PAIN MANAGEMENT | Age: 64
End: 2024-04-23
Payer: COMMERCIAL

## 2024-04-23 VITALS
HEART RATE: 76 BPM | WEIGHT: 208 LBS | BODY MASS INDEX: 32.58 KG/M2 | OXYGEN SATURATION: 93 % | DIASTOLIC BLOOD PRESSURE: 78 MMHG | SYSTOLIC BLOOD PRESSURE: 112 MMHG

## 2024-04-23 DIAGNOSIS — G89.4 CHRONIC PAIN SYNDROME: ICD-10-CM

## 2024-04-23 DIAGNOSIS — M47.816 ARTHROPATHY OF LUMBAR FACET JOINT: ICD-10-CM

## 2024-04-23 DIAGNOSIS — M53.86 DECREASED RANGE OF MOTION OF LUMBAR SPINE: ICD-10-CM

## 2024-04-23 DIAGNOSIS — M51.36 DDD (DEGENERATIVE DISC DISEASE), LUMBAR: ICD-10-CM

## 2024-04-23 DIAGNOSIS — Z51.81 ENCOUNTER FOR THERAPEUTIC DRUG MONITORING: ICD-10-CM

## 2024-04-23 DIAGNOSIS — M47.816 LUMBAR SPONDYLOSIS: ICD-10-CM

## 2024-04-23 DIAGNOSIS — M19.90 ARTHRITIS: ICD-10-CM

## 2024-04-23 DIAGNOSIS — M47.817 LUMBOSACRAL SPONDYLOSIS WITHOUT MYELOPATHY: ICD-10-CM

## 2024-04-23 PROCEDURE — 99213 OFFICE O/P EST LOW 20 MIN: CPT | Performed by: NURSE PRACTITIONER

## 2024-04-23 PROCEDURE — 1124F ACP DISCUSS-NO DSCNMKR DOCD: CPT | Performed by: NURSE PRACTITIONER

## 2024-04-23 RX ORDER — GABAPENTIN 800 MG/1
800 TABLET ORAL 3 TIMES DAILY
Qty: 270 TABLET | Refills: 0 | Status: SHIPPED | OUTPATIENT
Start: 2024-04-23 | End: 2024-07-22

## 2024-04-23 RX ORDER — PRIMIDONE 50 MG/1
50 TABLET ORAL NIGHTLY
Qty: 90 TABLET | Refills: 0 | Status: SHIPPED | OUTPATIENT
Start: 2024-04-23 | End: 2024-07-22

## 2024-04-23 NOTE — PROGRESS NOTES
Lawrence Epperson  1960  [id number]      HISTORY OF PRESENT ILLNESS: Mr. Epperson is a 64 y.o. male returns for a follow up visit for pain management  He has a diagnosis of   1. Lumbosacral spondylosis without myelopathy    2. DDD (degenerative disc disease), lumbar    3. Encounter for therapeutic drug monitoring    4. Decreased range of motion of lumbar spine    5. Lumbar spondylosis    6. Chronic pain syndrome    7. Arthritis    8. Arthropathy of lumbar facet joint    .      New Medications since Last Office visit have been reviewed with patient.     As per Information Obtained from the PADT (Patient Assessment and Documentation Tool)    He complains of pain in the lower back. He rates the pain 7/10 and describes it as sharp, aching. Current treatment regimen has helped relieve about 20% of the pain since beginning treatment plan.  He denies any side effects from the current pain regimen. Patient reports that since implementation of their treatment plan; their physical functioning is unchanged, family/social relationships are unchanged, mood is unchanged sleep patterns are worse, and that the overall functioning is unchanged.  Patient denies/admits that any of the above have changed since last office visit. Patient denies misusing/abusing his narcotic pain medications or using any illegal drugs.      Upon obtaining medical history from Mr. Epperson    ALLERGIES: Patients list of allergies were reviewed     MEDICATIONS: Mr. Epperson list of medications were reviewed.His current medications are   Outpatient Medications Prior to Visit   Medication Sig Dispense Refill    atorvastatin (LIPITOR) 80 MG tablet       clotrimazole (LOTRIMIN) 1 % cream clotrimazole 1 % topical cream   APPLY TO THE AFFECTED AND SURROUNDING AREAS OF SKIN ON FEET TWICE DAILY IN THE MORNING AND EVENING      Continuous Blood Gluc Sensor (FREESTYLE BILLY 14 DAY SENSOR) MISC USE AS DIRECTED      dorzolamide (TRUSOPT) 2 % ophthalmic solution

## 2024-07-09 ENCOUNTER — OFFICE VISIT (OUTPATIENT)
Dept: PAIN MANAGEMENT | Age: 64
End: 2024-07-09
Payer: COMMERCIAL

## 2024-07-09 VITALS
BODY MASS INDEX: 31.79 KG/M2 | SYSTOLIC BLOOD PRESSURE: 103 MMHG | DIASTOLIC BLOOD PRESSURE: 75 MMHG | OXYGEN SATURATION: 97 % | HEART RATE: 93 BPM | WEIGHT: 203 LBS

## 2024-07-09 DIAGNOSIS — G25.0 BENIGN ESSENTIAL TREMOR: ICD-10-CM

## 2024-07-09 DIAGNOSIS — G89.4 CHRONIC PAIN SYNDROME: ICD-10-CM

## 2024-07-09 DIAGNOSIS — Z51.81 ENCOUNTER FOR THERAPEUTIC DRUG MONITORING: ICD-10-CM

## 2024-07-09 DIAGNOSIS — M47.816 ARTHROPATHY OF LUMBAR FACET JOINT: ICD-10-CM

## 2024-07-09 DIAGNOSIS — M19.90 ARTHRITIS: ICD-10-CM

## 2024-07-09 DIAGNOSIS — M47.816 LUMBAR SPONDYLOSIS: ICD-10-CM

## 2024-07-09 DIAGNOSIS — M47.817 LUMBOSACRAL SPONDYLOSIS WITHOUT MYELOPATHY: Primary | ICD-10-CM

## 2024-07-09 DIAGNOSIS — M51.36 DDD (DEGENERATIVE DISC DISEASE), LUMBAR: ICD-10-CM

## 2024-07-09 DIAGNOSIS — M53.86 DECREASED RANGE OF MOTION OF LUMBAR SPINE: ICD-10-CM

## 2024-07-09 PROCEDURE — 1124F ACP DISCUSS-NO DSCNMKR DOCD: CPT | Performed by: NURSE PRACTITIONER

## 2024-07-09 PROCEDURE — 99214 OFFICE O/P EST MOD 30 MIN: CPT | Performed by: NURSE PRACTITIONER

## 2024-07-09 RX ORDER — PRIMIDONE 50 MG/1
50 TABLET ORAL NIGHTLY
Qty: 90 TABLET | Refills: 0 | Status: SHIPPED | OUTPATIENT
Start: 2024-07-09 | End: 2024-10-07

## 2024-07-09 RX ORDER — GABAPENTIN 800 MG/1
800 TABLET ORAL EVERY 6 HOURS
Qty: 360 TABLET | Refills: 0 | Status: SHIPPED | OUTPATIENT
Start: 2024-07-09 | End: 2024-10-07

## 2024-07-09 NOTE — PROGRESS NOTES
if medicines not taken as prescribed, were also discussed. If the patient develops new symptoms or if the symptoms worsen, the patient should call the office.    While transcribing every attempt was made to maintain the accuracy of the note in terms of it's contents,there may have been some errors made inadvertently.  Thank you for allowing me to participate in the care of this patient.    MARCIANO Demarco    Cc: Kasandra Caceres MD

## 2024-08-14 ENCOUNTER — OFFICE VISIT (OUTPATIENT)
Dept: PAIN MANAGEMENT | Age: 64
End: 2024-08-14
Payer: COMMERCIAL

## 2024-08-14 VITALS
DIASTOLIC BLOOD PRESSURE: 87 MMHG | SYSTOLIC BLOOD PRESSURE: 128 MMHG | OXYGEN SATURATION: 97 % | BODY MASS INDEX: 31.64 KG/M2 | WEIGHT: 202 LBS | HEART RATE: 93 BPM

## 2024-08-14 DIAGNOSIS — G25.0 BENIGN ESSENTIAL TREMOR: ICD-10-CM

## 2024-08-14 DIAGNOSIS — Z51.81 ENCOUNTER FOR THERAPEUTIC DRUG MONITORING: ICD-10-CM

## 2024-08-14 DIAGNOSIS — M53.86 DECREASED RANGE OF MOTION OF LUMBAR SPINE: ICD-10-CM

## 2024-08-14 DIAGNOSIS — M19.90 ARTHRITIS: ICD-10-CM

## 2024-08-14 DIAGNOSIS — M47.816 LUMBAR SPONDYLOSIS: ICD-10-CM

## 2024-08-14 DIAGNOSIS — M51.36 DDD (DEGENERATIVE DISC DISEASE), LUMBAR: ICD-10-CM

## 2024-08-14 DIAGNOSIS — G89.4 CHRONIC PAIN SYNDROME: ICD-10-CM

## 2024-08-14 DIAGNOSIS — M47.816 ARTHROPATHY OF LUMBAR FACET JOINT: ICD-10-CM

## 2024-08-14 DIAGNOSIS — M47.817 LUMBOSACRAL SPONDYLOSIS WITHOUT MYELOPATHY: Primary | ICD-10-CM

## 2024-08-14 PROCEDURE — 99214 OFFICE O/P EST MOD 30 MIN: CPT | Performed by: NURSE PRACTITIONER

## 2024-08-14 PROCEDURE — 1124F ACP DISCUSS-NO DSCNMKR DOCD: CPT | Performed by: NURSE PRACTITIONER

## 2024-08-14 RX ORDER — GABAPENTIN 800 MG/1
800 TABLET ORAL EVERY 6 HOURS
Qty: 360 TABLET | Refills: 0 | Status: SHIPPED | OUTPATIENT
Start: 2024-08-14 | End: 2024-11-12

## 2024-08-14 RX ORDER — PRIMIDONE 50 MG/1
50 TABLET ORAL NIGHTLY
Qty: 90 TABLET | Refills: 0 | Status: SHIPPED | OUTPATIENT
Start: 2024-08-14 | End: 2024-11-12

## 2024-08-14 RX ORDER — AMLODIPINE BESYLATE 5 MG/1
1 TABLET ORAL DAILY
COMMUNITY

## 2024-08-14 NOTE — PROGRESS NOTES
DIRECTED      dorzolamide (TRUSOPT) 2 % ophthalmic solution dorzolamide 2 % eye drops   INSTILL 1 DROP INTO EACH EYE TWICE DAILY      DULoxetine (CYMBALTA) 60 MG extended release capsule       fluticasone-salmeterol (ADVAIR) 250-50 MCG/ACT AEPB diskus inhaler       losartan (COZAAR) 100 MG tablet       propranolol (INDERAL LA) 60 MG extended release capsule propranolol ER 60 mg capsule,24 hr,extended release   TAKE 1 CAPSULE BY MOUTH AT BEDTIME      prednisoLONE acetate (PRED FORTE) 1 % ophthalmic suspension prednisolone acetate 1 % eye drops,suspension   AFTER SURGERY INSTILL 1 DROP INTO OPERATED EYE 4 TIMES DAILY FOR 2 WEEKS; THEN TWICE DAILY FOR 1 WEEK; THEN ONCE A DAY FOR 1 WEEK, THEN STOP      dulaglutide (TRULICITY) 1.5 MG/0.5ML SC injection 0.5 mLs      clotrimazole (LOTRIMIN) 1 % cream Apply topically 2 times daily Apply topically 2 times daily.      dapagliflozin (FARXIGA) 10 MG tablet Take 1 tablet by mouth every morning      FLUTICASONE FUROATE IN Inhale 250 mcg into the lungs      insulin glargine (LANTUS) 100 UNIT/ML injection vial Inject into the skin nightly      losartan (COZAAR) 50 MG tablet Take 1 tablet by mouth 2 times daily      pantoprazole (PROTONIX) 20 MG tablet Take 1 tablet by mouth daily      loratadine (CLARITIN) 10 MG tablet Take 1 tablet by mouth daily 15 tablet 0    Dulaglutide 0.75 MG/0.5ML SOPN Inject 0.5 mLs into the skin      aspirin EC 81 MG EC tablet Take 1 tablet by mouth daily 30 tablet 11    albuterol sulfate  (90 BASE) MCG/ACT inhaler Inhale 2 puffs into the lungs every 6 hours as needed for Wheezing 1 Inhaler 2    metFORMIN (GLUCOPHAGE) 500 MG tablet Take 1 tablet by mouth 2 times daily (with meals) (Patient taking differently: Take 1 tablet by mouth 3 times daily (before meals)) 180 tablet 0    atorvastatin (LIPITOR) 40 MG tablet Take 1 tablet by mouth daily (Patient taking differently: Take 2 tablets by mouth daily) 30 tablet 2    Glucose Blood (BLOOD GLUCOSE TEST

## 2025-01-01 ENCOUNTER — TELEPHONE (OUTPATIENT)
Dept: PAIN MANAGEMENT | Age: 65
End: 2025-01-01

## 2025-01-01 DIAGNOSIS — M51.369 DDD (DEGENERATIVE DISC DISEASE), LUMBAR: ICD-10-CM

## 2025-01-01 DIAGNOSIS — M53.86 DECREASED RANGE OF MOTION OF LUMBAR SPINE: ICD-10-CM

## 2025-01-01 DIAGNOSIS — M47.816 ARTHROPATHY OF LUMBAR FACET JOINT: ICD-10-CM

## 2025-01-01 DIAGNOSIS — M19.90 ARTHRITIS: ICD-10-CM

## 2025-01-01 DIAGNOSIS — M47.816 LUMBAR SPONDYLOSIS: ICD-10-CM

## 2025-01-01 DIAGNOSIS — G89.4 CHRONIC PAIN SYNDROME: ICD-10-CM

## 2025-01-01 DIAGNOSIS — M47.817 LUMBOSACRAL SPONDYLOSIS WITHOUT MYELOPATHY: ICD-10-CM

## 2025-01-01 DIAGNOSIS — Z51.81 ENCOUNTER FOR THERAPEUTIC DRUG MONITORING: ICD-10-CM

## 2025-01-02 RX ORDER — GABAPENTIN 800 MG/1
800 TABLET ORAL EVERY 6 HOURS
Qty: 360 TABLET | Refills: 0 | OUTPATIENT
Start: 2025-01-02

## 2025-01-03 RX ORDER — GABAPENTIN 800 MG/1
800 TABLET ORAL EVERY 6 HOURS
Qty: 120 TABLET | Refills: 0 | Status: SHIPPED | OUTPATIENT
Start: 2025-01-03 | End: 2025-02-02

## 2025-01-29 ENCOUNTER — OFFICE VISIT (OUTPATIENT)
Dept: PAIN MANAGEMENT | Age: 65
End: 2025-01-29
Payer: MEDICARE

## 2025-01-29 VITALS
OXYGEN SATURATION: 95 % | DIASTOLIC BLOOD PRESSURE: 81 MMHG | BODY MASS INDEX: 32.58 KG/M2 | SYSTOLIC BLOOD PRESSURE: 145 MMHG | HEART RATE: 80 BPM | WEIGHT: 208 LBS

## 2025-01-29 DIAGNOSIS — Z51.81 ENCOUNTER FOR THERAPEUTIC DRUG MONITORING: ICD-10-CM

## 2025-01-29 DIAGNOSIS — G25.0 BENIGN ESSENTIAL TREMOR: ICD-10-CM

## 2025-01-29 DIAGNOSIS — M47.817 LUMBOSACRAL SPONDYLOSIS WITHOUT MYELOPATHY: Primary | ICD-10-CM

## 2025-01-29 DIAGNOSIS — M53.86 DECREASED RANGE OF MOTION OF LUMBAR SPINE: ICD-10-CM

## 2025-01-29 DIAGNOSIS — G89.4 CHRONIC PAIN SYNDROME: ICD-10-CM

## 2025-01-29 DIAGNOSIS — M19.90 ARTHRITIS: ICD-10-CM

## 2025-01-29 DIAGNOSIS — M47.816 LUMBAR SPONDYLOSIS: ICD-10-CM

## 2025-01-29 DIAGNOSIS — M47.816 ARTHROPATHY OF LUMBAR FACET JOINT: ICD-10-CM

## 2025-01-29 PROCEDURE — G8427 DOCREV CUR MEDS BY ELIG CLIN: HCPCS | Performed by: NURSE PRACTITIONER

## 2025-01-29 PROCEDURE — 4004F PT TOBACCO SCREEN RCVD TLK: CPT | Performed by: NURSE PRACTITIONER

## 2025-01-29 PROCEDURE — 99214 OFFICE O/P EST MOD 30 MIN: CPT | Performed by: NURSE PRACTITIONER

## 2025-01-29 PROCEDURE — G8417 CALC BMI ABV UP PARAM F/U: HCPCS | Performed by: NURSE PRACTITIONER

## 2025-01-29 PROCEDURE — 1124F ACP DISCUSS-NO DSCNMKR DOCD: CPT | Performed by: NURSE PRACTITIONER

## 2025-01-29 PROCEDURE — 1159F MED LIST DOCD IN RCRD: CPT | Performed by: NURSE PRACTITIONER

## 2025-01-29 PROCEDURE — 3017F COLORECTAL CA SCREEN DOC REV: CPT | Performed by: NURSE PRACTITIONER

## 2025-01-29 PROCEDURE — 1160F RVW MEDS BY RX/DR IN RCRD: CPT | Performed by: NURSE PRACTITIONER

## 2025-01-29 RX ORDER — GABAPENTIN 800 MG/1
800 TABLET ORAL EVERY 6 HOURS
Qty: 120 TABLET | Refills: 2 | Status: SHIPPED | OUTPATIENT
Start: 2025-01-29 | End: 2025-04-29

## 2025-01-29 RX ORDER — GLIPIZIDE 10 MG/1
10 TABLET, FILM COATED, EXTENDED RELEASE ORAL DAILY
COMMUNITY
Start: 2024-11-15

## 2025-01-29 RX ORDER — SODIUM, POTASSIUM,MAG SULFATES 17.5-3.13G
6 SOLUTION, RECONSTITUTED, ORAL ORAL 2 TIMES DAILY
COMMUNITY
Start: 2024-12-12

## 2025-01-29 NOTE — PROGRESS NOTES
medications.Risk of overdose and death, if medicines not taken as prescribed, were also discussed. If the patient develops new symptoms or if the symptoms worsen, the patient should call the office.    While transcribing every attempt was made to maintain the accuracy of the note in terms of it's contents,there may have been some errors made inadvertently.  Thank you for allowing me to participate in the care of this patient.    Celine García NP-C    Cc: Kasandra Caceres MD

## 2025-04-23 ENCOUNTER — OFFICE VISIT (OUTPATIENT)
Dept: PAIN MANAGEMENT | Age: 65
End: 2025-04-23
Payer: MEDICARE

## 2025-04-23 VITALS
WEIGHT: 209 LBS | SYSTOLIC BLOOD PRESSURE: 134 MMHG | BODY MASS INDEX: 32.73 KG/M2 | HEART RATE: 89 BPM | DIASTOLIC BLOOD PRESSURE: 83 MMHG | OXYGEN SATURATION: 94 %

## 2025-04-23 DIAGNOSIS — G25.0 BENIGN ESSENTIAL TREMOR: ICD-10-CM

## 2025-04-23 DIAGNOSIS — M47.816 ARTHROPATHY OF LUMBAR FACET JOINT: ICD-10-CM

## 2025-04-23 DIAGNOSIS — M53.86 DECREASED RANGE OF MOTION OF LUMBAR SPINE: ICD-10-CM

## 2025-04-23 DIAGNOSIS — M47.816 LUMBAR SPONDYLOSIS: ICD-10-CM

## 2025-04-23 DIAGNOSIS — M47.817 LUMBOSACRAL SPONDYLOSIS WITHOUT MYELOPATHY: Primary | ICD-10-CM

## 2025-04-23 DIAGNOSIS — Z51.81 ENCOUNTER FOR THERAPEUTIC DRUG MONITORING: ICD-10-CM

## 2025-04-23 DIAGNOSIS — G89.4 CHRONIC PAIN SYNDROME: ICD-10-CM

## 2025-04-23 DIAGNOSIS — M19.90 ARTHRITIS: ICD-10-CM

## 2025-04-23 PROCEDURE — G8417 CALC BMI ABV UP PARAM F/U: HCPCS | Performed by: NURSE PRACTITIONER

## 2025-04-23 PROCEDURE — 99213 OFFICE O/P EST LOW 20 MIN: CPT | Performed by: NURSE PRACTITIONER

## 2025-04-23 PROCEDURE — 1160F RVW MEDS BY RX/DR IN RCRD: CPT | Performed by: NURSE PRACTITIONER

## 2025-04-23 PROCEDURE — 1124F ACP DISCUSS-NO DSCNMKR DOCD: CPT | Performed by: NURSE PRACTITIONER

## 2025-04-23 PROCEDURE — G8427 DOCREV CUR MEDS BY ELIG CLIN: HCPCS | Performed by: NURSE PRACTITIONER

## 2025-04-23 PROCEDURE — 3017F COLORECTAL CA SCREEN DOC REV: CPT | Performed by: NURSE PRACTITIONER

## 2025-04-23 PROCEDURE — 1159F MED LIST DOCD IN RCRD: CPT | Performed by: NURSE PRACTITIONER

## 2025-04-23 PROCEDURE — 4004F PT TOBACCO SCREEN RCVD TLK: CPT | Performed by: NURSE PRACTITIONER

## 2025-04-23 RX ORDER — GABAPENTIN 800 MG/1
800 TABLET ORAL EVERY 6 HOURS
Qty: 120 TABLET | Refills: 2 | Status: SHIPPED | OUTPATIENT
Start: 2025-04-23 | End: 2025-07-22

## 2025-04-23 NOTE — PROGRESS NOTES
Lawrence Epperson  1960  [id number]      HISTORY OF PRESENT ILLNESS: Mr. Epperson is a 65 y.o. male returns for a follow up visit for pain management  He has a diagnosis of   1. Lumbosacral spondylosis without myelopathy    2. Arthritis    3. Arthropathy of lumbar facet joint    4. Encounter for therapeutic drug monitoring    5. Decreased range of motion of lumbar spine    6. Lumbar spondylosis    7. Chronic pain syndrome    8. Benign essential tremor    .      New Medications since Last Office visit have been reviewed with patient.     As per Information Obtained from the PADT (Patient Assessment and Documentation Tool)    He complains of pain in the lower back. He rates the pain 7.5 and describes it as aching, throbbing. Current treatment regimen has helped relieve about 75% of the pain since beginning treatment plan.  He denies any side effects from the current pain regimen. Patient reports that since implementation of their treatment plan; their physical functioning is unchanged, family/social relationships are unchanged, mood is unchanged sleep patterns are unchanged, and that the overall functioning is unchanged.  Patient denies/admits that any of the above have changed since last office visit. Patient denies misusing/abusing his narcotic pain medications or using any illegal drugs.      Upon obtaining medical history from Mr. Epperson    ALLERGIES: Patients list of allergies were reviewed     MEDICATIONS: Mr. Epperson list of medications were reviewed.His current medications are   Outpatient Medications Prior to Visit   Medication Sig Dispense Refill    Continuous Glucose  (FREESTYLE BILLY 2 READER) MARIA ALEJANDRA USE AS DIRECTED      glipiZIDE (GLUCOTROL XL) 10 MG extended release tablet Take 1 tablet by mouth daily      sodium-potassium-mag sulfate (SUPREP) 17.5-3.13-1.6 GM/177ML SOLN solution Take 6 oz by mouth 2 times daily      gabapentin (NEURONTIN) 800 MG tablet Take 1 tablet by mouth every 6 hours for 90

## 2025-04-27 LAB
1,3 CHLOROPHENYL PIPERAZINE, URINE: NOT DETECTED
6-MONOACETYLMORPHINE: NEGATIVE NG/ML
7-AMINOCLONAZEPAM/CREATININE URINE: NOT DETECTED NG/MG CREAT
8-HYDROXYAMOXAPINE, URINE: NOT DETECTED
8-HYDROXYLOXAPINE, URINE: NOT DETECTED
ACETAMINOPHEN, URINE: NEGATIVE UG/ML
ALPHA HYDROXYALPRAZOLAM/CREATININE URINE: NOT DETECTED NG/MG CREAT
ALPHA HYDROXYTRIAZOLAM/CREAT UR CFM: NOT DETECTED NG/MG CREAT
ALPHA-HYDROXYMIDAZOLAM, URINE: NOT DETECTED NG/MG CREAT
ALPRAZOLAM/CREATININE URINE: NOT DETECTED NG/MG CREAT
AMINO CHLOROPYRIDINE, URINE: NOT DETECTED
AMITRIPTYLINE: NOT DETECTED
AMOXAPINE, URINE: NOT DETECTED
AMPHETAMINE SCREEN URINE: NEGATIVE NG/ML
ANTIDEPRESSANTS SCREEN URINE: NEGATIVE
ANTIPSYCHOTICS SCREEN URINE: NEGATIVE
ARIPIPRAZOLE, URINE: NOT DETECTED
ASENAPINE, URINE: NOT DETECTED
BACLOFEN, URINE: NOT DETECTED
BARBITURATE SCREEN URINE: NEGATIVE NG/ML
BENZODIAZEPINE SCREEN, URINE: NEGATIVE
BUPRENORPHINE URINE: NEGATIVE
BUPRENORPHINE/CREATININE URINE: NOT DETECTED NG/MG CREAT
BUPROPION, URINE: NOT DETECTED
CANNABINOID SCREEN URINE: NEGATIVE NG/ML
CARBAMAZEPINE, URINE: NOT DETECTED
CARISOPRODOL, UR: NOT DETECTED
CHLORPROMAZINE, URINE: NOT DETECTED
CITALOPRAM, URINE: NOT DETECTED
CLOBAZAM, URINE: NOT DETECTED
CLOMIPRAMINE, URINE: NOT DETECTED
CLONAZEPAM/CREATININE URINE: NOT DETECTED NG/MG CREAT
CLOZAPINE, URINE: NOT DETECTED
COCAINE METABOLITE SCREEN URINE: NEGATIVE NG/ML
CREATININE URINE: 85 MG/DL
CYCLOBENZAPRINE, URINE: NOT DETECTED
DESALKYLFLURAZEPAM/CREATININE URINE: NOT DETECTED NG/MG CREAT
DESIPRAMINE: NOT DETECTED
DESMETHYLCITALOPRAM, URINE: NOT DETECTED
DESMETHYLCLOMIPRAMINE, URINE: NOT DETECTED
DESMETHYLCLOZAPINE, URINE: NOT DETECTED
DESMETHYLCYCLOBENZAPRINE, URINE: NOT DETECTED
DESMETHYLDOXEPIN, URINE: NOT DETECTED
DESMETHYLFLUNITRAZEPAM, URINE: NOT DETECTED NG/MG CREAT
DESMETHYLSERTRALINE, URINE: NOT DETECTED
DESMETHYLVENLAFAXINE, URINE: NOT DETECTED
DIAZEPAM/CREATININE URINE: NOT DETECTED NG/MG CREAT
DOXEPIN, URINE: NOT DETECTED
DULOXETINE, URINE: NOT DETECTED
EZOGABINE, URINE: NOT DETECTED
FENTANYL / ANALOGUES SCREEN URINE: NEGATIVE
FENTANYL/CREATININE URINE: NOT DETECTED NG/MG CREAT
FLUNITRAZEPAM, URINE: NOT DETECTED NG/MG CREAT
FLUOXETINE, URINE: NOT DETECTED
FLUPHENAZINE, URINE: NOT DETECTED
FLUVOXAMINE, URINE: NOT DETECTED
GABAPENTIN: NOT DETECTED
HALOPERIDOL, URINE: NOT DETECTED
HYDROXYBUPROPION, URINE: NOT DETECTED
ILOPERIDONE, URINE: NOT DETECTED
IMIPRAMINE, URINE: NOT DETECTED
KETAMINE, URINE: NOT DETECTED
LABORATORY REPORT: NORMAL
LAMOTRIGINE, URINE: NOT DETECTED
LEVETIRACETAM, URINE: NOT DETECTED
LORAZEPAM/CREATININE URINE: NOT DETECTED NG/MG CREAT
LOXAPINE, URINE: NOT DETECTED
LURASIDONE, URINE: NOT DETECTED
MAPROTILINE, URINE: NOT DETECTED
MEPERIDINE: NEGATIVE NG/ML
MEPROBAMATE, URINE: NOT DETECTED
MESORIDAZINE, URINE: NOT DETECTED
METAXALONE, URINE: NOT DETECTED
METHADONE AND METABOLITES, URINE: NEGATIVE NG/ML
METHADONE SCREEN, URINE: NEGATIVE NG/ML
METHOCARBAMOL, URINE: NOT DETECTED
METHYLPHENIDATE, URINE: NOT DETECTED
MIDAZOLAM/CREATININE URINE: NOT DETECTED NG/MG CREAT
MIRTAZAPINE, URINE: NOT DETECTED
MOLINDONE, URINE: NOT DETECTED
MUSCLE RELAXANTS SCREEN URINE: NEGATIVE
NEFAZODONE, URINE: NOT DETECTED
NORBUPRENORPHINE/CREATININE URINE: NOT DETECTED NG/MG CREAT
NORDIAZEPAM/CREATININE URINE: NOT DETECTED NG/MG CREAT
NORFENTANYL/CREATININE URINE: NOT DETECTED NG/MG CREAT
NORFLUOXETINE, URINE: NOT DETECTED
NORKETAMINE, URINE: NOT DETECTED
NORTRIPTYLINE: NOT DETECTED
OLANZAPINE, URINE: NOT DETECTED
OPIATE SCREEN URINE: NEGATIVE NG/ML
ORPHENADRINE, URINE: NOT DETECTED
OTHER HALLUCINOGENS SCREEN URINE: NEGATIVE
OXAZEPAM/CREATININE URINE: NOT DETECTED NG/MG CREAT
OXCARBAZEPINE MHD, URINE: NOT DETECTED
OXYCODONE SCREEN URINE: NEGATIVE NG/ML
PAROXETINE, URINE: NOT DETECTED
PERPHENAZINE, URINE: NOT DETECTED
PHENCYCLIDINE SCREEN URINE: NEGATIVE NG/ML
PHENYTOIN, URINE: NOT DETECTED
PIMOZIDE, URINE: NOT DETECTED
PREGABALIN, URINE: NOT DETECTED
PREGABALIN: NEGATIVE
PRESCRIBED MEDICATIONS: NORMAL
PRIMIDONE, URINE: NOT DETECTED
PROCHLORPERAZINE, URINE: NOT DETECTED
PROPOXYPHENE: NEGATIVE NG/ML
PROTRIPTYLINE, URINE: NOT DETECTED
QUETIAPINE, URINE: NOT DETECTED
RISPERIDONE, URINE: NOT DETECTED
RITALINIC ACID, UR: NOT DETECTED
RUFINAMIDE, URINE: NOT DETECTED
SEDATIVES/HYPNOTICS SCREEN URINE: NEGATIVE
SERTRALINE, URINE: NOT DETECTED
SYMPATHOMIMETICS SCREEN URINE: NEGATIVE
TAPENTADOL SCREEN URINE: NEGATIVE NG/ML
TEMAZEPAM/CREATININE URINE: NOT DETECTED NG/MG CREAT
THIORIDAZINE, URINE: NOT DETECTED
THIOTHIXENE, URINE: NOT DETECTED
TIAGABINE, URINE: NOT DETECTED
TIZANIDINE, URINE: NOT DETECTED
TOPIRAMATE, URINE: NOT DETECTED
TRAMADOL SCREEN URINE: NEGATIVE NG/ML
TRAZODONE, URINE: NOT DETECTED
TRIFLUOPERAZINE, URINE: NOT DETECTED
TRIMIPRAMINE, URINE: NOT DETECTED
VENLAFAXINE, URINE: NOT DETECTED
VILAZODONE, URINE: NOT DETECTED
ZALEPLON, URINE: NOT DETECTED
ZIPRASIDONE, URINE: NOT DETECTED
ZOLPIDEM ACID, URINE: NOT DETECTED
ZOLPIDEM, URINE: NOT DETECTED
ZONISAMIDE, URINE: NOT DETECTED
ZOPICLONE/ESZOPICLONE, URINE: NOT DETECTED

## 2025-07-16 ENCOUNTER — OFFICE VISIT (OUTPATIENT)
Dept: PAIN MANAGEMENT | Age: 65
End: 2025-07-16
Payer: MEDICARE

## 2025-07-16 VITALS
SYSTOLIC BLOOD PRESSURE: 149 MMHG | DIASTOLIC BLOOD PRESSURE: 97 MMHG | HEART RATE: 89 BPM | BODY MASS INDEX: 31.95 KG/M2 | WEIGHT: 204 LBS | OXYGEN SATURATION: 96 %

## 2025-07-16 DIAGNOSIS — M47.816 ARTHROPATHY OF LUMBAR FACET JOINT: ICD-10-CM

## 2025-07-16 DIAGNOSIS — G25.0 BENIGN ESSENTIAL TREMOR: ICD-10-CM

## 2025-07-16 DIAGNOSIS — M53.86 DECREASED RANGE OF MOTION OF LUMBAR SPINE: ICD-10-CM

## 2025-07-16 DIAGNOSIS — G89.4 CHRONIC PAIN SYNDROME: ICD-10-CM

## 2025-07-16 DIAGNOSIS — M47.816 LUMBAR SPONDYLOSIS: ICD-10-CM

## 2025-07-16 DIAGNOSIS — Z51.81 ENCOUNTER FOR THERAPEUTIC DRUG MONITORING: ICD-10-CM

## 2025-07-16 DIAGNOSIS — M19.90 ARTHRITIS: ICD-10-CM

## 2025-07-16 DIAGNOSIS — M47.817 LUMBOSACRAL SPONDYLOSIS WITHOUT MYELOPATHY: Primary | ICD-10-CM

## 2025-07-16 PROCEDURE — 4004F PT TOBACCO SCREEN RCVD TLK: CPT | Performed by: NURSE PRACTITIONER

## 2025-07-16 PROCEDURE — 1159F MED LIST DOCD IN RCRD: CPT | Performed by: NURSE PRACTITIONER

## 2025-07-16 PROCEDURE — G8427 DOCREV CUR MEDS BY ELIG CLIN: HCPCS | Performed by: NURSE PRACTITIONER

## 2025-07-16 PROCEDURE — 1160F RVW MEDS BY RX/DR IN RCRD: CPT | Performed by: NURSE PRACTITIONER

## 2025-07-16 PROCEDURE — 1124F ACP DISCUSS-NO DSCNMKR DOCD: CPT | Performed by: NURSE PRACTITIONER

## 2025-07-16 PROCEDURE — G8417 CALC BMI ABV UP PARAM F/U: HCPCS | Performed by: NURSE PRACTITIONER

## 2025-07-16 PROCEDURE — 3017F COLORECTAL CA SCREEN DOC REV: CPT | Performed by: NURSE PRACTITIONER

## 2025-07-16 PROCEDURE — 99214 OFFICE O/P EST MOD 30 MIN: CPT | Performed by: NURSE PRACTITIONER

## 2025-07-16 RX ORDER — GABAPENTIN 800 MG/1
800 TABLET ORAL EVERY 6 HOURS
Qty: 120 TABLET | Refills: 2 | Status: SHIPPED | OUTPATIENT
Start: 2025-07-16 | End: 2025-10-14

## 2025-07-16 NOTE — PROGRESS NOTES
Use twice daily 60 strip 11    Lancets MISC Use twice per day 60 each 11     No current facility-administered medications for this visit.     I will continue his current medication regimen  which is part of the above treatment schedule. It has been helping with Mr. Epperson's chronic  medical problems which for this visit include:   The primary encounter diagnosis was Lumbosacral spondylosis without myelopathy. Diagnoses of Arthritis, Arthropathy of lumbar facet joint, Encounter for therapeutic drug monitoring, Decreased range of motion of lumbar spine, Lumbar spondylosis, Chronic pain syndrome, and Benign essential tremor were also pertinent to this visit.   Risks and benefits of the medications and other alternative treatments  including no treatment were discussed with the patient.The common side effects of these medications were also explained to the patient.  Informed verbal consent was obtained.   Goals of current treatment regimen include:   *Improvement in pain by using the least amount of medication therapy to be satisfactorily functional.    *To restore functioning with focus on improvement in physical performance, general activity, ADLs, work or disability, emotional distress   *Patient was educated on and understands the limitations of opiates regarding their inability to remove pain long term.   Will continue to monitor progress towards achieving/maintaining therapeutic goals with special emphasis on  1. Improvement in perceived interfernce of pain with ADL's. Ability to do home exercises independently. Ability to do household chores indoor and/or outdoor work and social and leisure activities.Improve psychosocial and physical functioning. he is showing progression towards this treatment goal with the current regimen.     He was advised against drinking alcohol with the narcotic pain medicines, advised against driving or handling machinery while adjusting the dose of medicines or if having cognitive